# Patient Record
Sex: FEMALE | Race: WHITE | NOT HISPANIC OR LATINO | Employment: FULL TIME | URBAN - METROPOLITAN AREA
[De-identification: names, ages, dates, MRNs, and addresses within clinical notes are randomized per-mention and may not be internally consistent; named-entity substitution may affect disease eponyms.]

---

## 2018-10-19 ENCOUNTER — ANNUAL EXAM (OUTPATIENT)
Dept: GYNECOLOGY | Facility: CLINIC | Age: 63
End: 2018-10-19
Payer: COMMERCIAL

## 2018-10-19 VITALS
DIASTOLIC BLOOD PRESSURE: 80 MMHG | SYSTOLIC BLOOD PRESSURE: 142 MMHG | HEART RATE: 72 BPM | WEIGHT: 179 LBS | HEIGHT: 66 IN | RESPIRATION RATE: 15 BRPM | BODY MASS INDEX: 28.77 KG/M2

## 2018-10-19 DIAGNOSIS — Z11.51 SCREENING FOR HUMAN PAPILLOMAVIRUS: ICD-10-CM

## 2018-10-19 DIAGNOSIS — Z12.31 ENCOUNTER FOR SCREENING MAMMOGRAM FOR MALIGNANT NEOPLASM OF BREAST: ICD-10-CM

## 2018-10-19 DIAGNOSIS — Z79.890 HORMONE REPLACEMENT THERAPY: ICD-10-CM

## 2018-10-19 DIAGNOSIS — Z01.419 ENCOUNTER FOR GYNECOLOGICAL EXAMINATION (GENERAL) (ROUTINE) WITHOUT ABNORMAL FINDINGS: Primary | ICD-10-CM

## 2018-10-19 DIAGNOSIS — Z13.820 SCREENING FOR OSTEOPOROSIS: ICD-10-CM

## 2018-10-19 PROCEDURE — 99396 PREV VISIT EST AGE 40-64: CPT | Performed by: OBSTETRICS & GYNECOLOGY

## 2018-10-19 PROCEDURE — G0145 SCR C/V CYTO,THINLAYER,RESCR: HCPCS | Performed by: OBSTETRICS & GYNECOLOGY

## 2018-10-19 PROCEDURE — 87624 HPV HI-RISK TYP POOLED RSLT: CPT | Performed by: OBSTETRICS & GYNECOLOGY

## 2018-10-19 RX ORDER — ESTRADIOL 1 MG/1
1 TABLET ORAL DAILY
Qty: 90 TABLET | Refills: 4 | Status: SHIPPED | OUTPATIENT
Start: 2018-10-19 | End: 2019-09-27 | Stop reason: HOSPADM

## 2018-10-19 NOTE — PROGRESS NOTES
Assessment/Plan:    1  Annual GYN exam    2  SHEFALI and large cystocele -- referral to urogynecology  3   Pt wants to continue estrogen replacement therapy  Diagnoses and all orders for this visit:    Encounter for gynecological examination (general) (routine) without abnormal findings  -     Liquid-based pap, screening    Screening for human papillomavirus  -     Liquid-based pap, screening    Encounter for screening mammogram for malignant neoplasm of breast  -     Mammo screening bilateral w 3d & cad; Future    Screening for osteoporosis  -     DXA bone density spine hip and pelvis; Future    Hormone replacement therapy  -     estradiol (ESTRACE) 1 mg tablet; Take 1 tablet (1 mg total) by mouth daily          Subjective:      Patient ID: Bony Good is a 65 y o  female  The patient is a 69-year-old who presents for an annual gyn exam   She has had a previous hysterectomy and BSO for prolapse  She believes she had an anterior colporrhaphy done at the same time  She denies any vaginal bleeding or breast problems  The patient continues to have a problem with stress urinary incontinence and now has to wear a pad all the time  This is more and more annoying and she thinks she is ready to see a urogynecologist for evaluation and treatment for this  She was referred to Dr Drake Cameron  She denies any vaginal dryness or dyspareunia  The following portions of the patient's history were reviewed and updated as appropriate: allergies, current medications, past family history, past medical history, past social history, past surgical history and problem list     Review of Systems   Constitutional: Negative  Respiratory: Negative for shortness of breath  Cardiovascular: Negative for chest pain  Gastrointestinal: Negative  Genitourinary: Negative  Musculoskeletal: Positive for back pain  Skin: Negative  Psychiatric/Behavioral: Negative for agitation, behavioral problems and confusion  Objective:      /80 (Cuff Size: Adult)   Pulse 72   Resp 15   Ht 5' 6" (1 676 m)   Wt 81 2 kg (179 lb)   BMI 28 89 kg/m²          Physical Exam   Constitutional: She appears well-developed and well-nourished  No distress  HENT:   Head: Normocephalic  Pulmonary/Chest: Effort normal  No respiratory distress  Abdominal: She exhibits no distension and no mass  There is no tenderness  There is no rebound and no guarding  Genitourinary: Rectum normal and uterus normal  No breast swelling, tenderness, discharge or bleeding  No labial fusion  There is no rash, tenderness, lesion or injury on the right labia  There is no rash, tenderness, lesion or injury on the left labia  Right adnexum displays no mass  Left adnexum displays no mass  No erythema, tenderness or bleeding in the vagina  No foreign body in the vagina  No signs of injury around the vagina  No vaginal discharge found  Genitourinary Comments: The cervix uterus and adnexa are surgically absent  Atrophic vaginal changes are noted  Large cystocele  Lymphadenopathy:        Right axillary: No pectoral and no lateral adenopathy present  Left axillary: No pectoral and no lateral adenopathy present  Skin: Skin is warm, dry and intact  She is not diaphoretic  No cyanosis  Nails show no clubbing  Psychiatric: She has a normal mood and affect   Her speech is normal and behavior is normal

## 2018-10-23 LAB
HPV HR 12 DNA CVX QL NAA+PROBE: NEGATIVE
HPV16 DNA CVX QL NAA+PROBE: NEGATIVE
HPV18 DNA CVX QL NAA+PROBE: NEGATIVE

## 2018-10-25 LAB
LAB AP GYN PRIMARY INTERPRETATION: NORMAL
Lab: NORMAL

## 2018-11-29 ENCOUNTER — TELEPHONE (OUTPATIENT)
Dept: GYNECOLOGY | Facility: CLINIC | Age: 63
End: 2018-11-29

## 2018-11-29 DIAGNOSIS — R92.2 INCONCLUSIVE MAMMOGRAM: Primary | ICD-10-CM

## 2018-11-29 NOTE — TELEPHONE ENCOUNTER
Scanned breast ultrasound from [facility]  Attempted to send to Dr Drake Castillo  If does not show up in your inbasket, please comment below  Ultrasound report scanned in media tab

## 2018-11-30 NOTE — TELEPHONE ENCOUNTER
Notify pt that her u/s did not show any abnormalities, just some denser tissue  She needs a f/u bilat mammography in 6 months

## 2019-04-29 ENCOUNTER — TELEPHONE (OUTPATIENT)
Dept: FAMILY MEDICINE CLINIC | Facility: CLINIC | Age: 64
End: 2019-04-29

## 2019-05-13 ENCOUNTER — TELEPHONE (OUTPATIENT)
Dept: GYNECOLOGY | Facility: CLINIC | Age: 64
End: 2019-05-13

## 2019-05-13 ENCOUNTER — TELEPHONE (OUTPATIENT)
Dept: FAMILY MEDICINE CLINIC | Facility: CLINIC | Age: 64
End: 2019-05-13

## 2019-05-13 DIAGNOSIS — Z01.812 BLOOD TESTS PRIOR TO TREATMENT OR PROCEDURE: ICD-10-CM

## 2019-05-13 DIAGNOSIS — R92.8 ABNORMAL FINDING ON RADIOLOGICAL EXAMINATION OF BREAST: Primary | ICD-10-CM

## 2019-05-16 DIAGNOSIS — R92.2 INCONCLUSIVE MAMMOGRAM: ICD-10-CM

## 2019-05-17 ENCOUNTER — TELEPHONE (OUTPATIENT)
Dept: GYNECOLOGY | Facility: CLINIC | Age: 64
End: 2019-05-17

## 2019-05-20 ENCOUNTER — TELEPHONE (OUTPATIENT)
Dept: GYNECOLOGY | Facility: CLINIC | Age: 64
End: 2019-05-20

## 2019-05-21 ENCOUNTER — TELEPHONE (OUTPATIENT)
Dept: GYNECOLOGY | Facility: CLINIC | Age: 64
End: 2019-05-21

## 2019-06-03 ENCOUNTER — TELEPHONE (OUTPATIENT)
Dept: FAMILY MEDICINE CLINIC | Facility: CLINIC | Age: 64
End: 2019-06-03

## 2019-06-03 ENCOUNTER — TELEPHONE (OUTPATIENT)
Dept: GYNECOLOGY | Facility: CLINIC | Age: 64
End: 2019-06-03

## 2019-06-05 ENCOUNTER — TELEPHONE (OUTPATIENT)
Dept: GYNECOLOGY | Facility: CLINIC | Age: 64
End: 2019-06-05

## 2019-06-17 ENCOUNTER — TELEPHONE (OUTPATIENT)
Dept: GYNECOLOGY | Facility: CLINIC | Age: 64
End: 2019-06-17

## 2019-06-17 DIAGNOSIS — R92.2 INCONCLUSIVE MAMMOGRAM: Primary | ICD-10-CM

## 2019-06-18 ENCOUNTER — TELEPHONE (OUTPATIENT)
Dept: GYNECOLOGY | Facility: CLINIC | Age: 64
End: 2019-06-18

## 2019-06-18 ENCOUNTER — TELEPHONE (OUTPATIENT)
Dept: HEMATOLOGY ONCOLOGY | Facility: CLINIC | Age: 64
End: 2019-06-18

## 2019-06-18 DIAGNOSIS — R92.2 INCONCLUSIVE MAMMOGRAPHY: Primary | ICD-10-CM

## 2019-06-25 ENCOUNTER — HOSPITAL ENCOUNTER (OUTPATIENT)
Dept: MAMMOGRAPHY | Facility: CLINIC | Age: 64
Discharge: HOME | End: 2019-06-25
Payer: COMMERCIAL

## 2019-06-25 ENCOUNTER — TELEPHONE (OUTPATIENT)
Dept: GYNECOLOGY | Facility: CLINIC | Age: 64
End: 2019-06-25

## 2019-06-25 DIAGNOSIS — R92.2 INCONCLUSIVE MAMMOGRAM: ICD-10-CM

## 2019-06-25 PROCEDURE — 76642 ULTRASOUND BREAST LIMITED: CPT

## 2019-06-26 RX ORDER — DIAPER,BRIEF,INFANT-TODD,DISP
EACH MISCELLANEOUS 2 TIMES DAILY PRN
COMMUNITY

## 2019-06-26 NOTE — PRE-PROCEDURE INSTRUCTIONS
Pre-Surgery Instructions:   Medication Instructions    estradiol (ESTRACE) 1 mg tablet Instructed patient per Anesthesia Guidelines   fexofenadine (ALLEGRA) 180 MG tablet Instructed patient per Anesthesia Guidelines   hydrocortisone 0 5 % cream Instructed patient per Anesthesia Guidelines   lisinopril (ZESTRIL) 10 mg tablet Instructed patient per Anesthesia Guidelines   metoprolol tartrate (LOPRESSOR) 50 mg tablet Instructed patient per Anesthesia Guidelines  Patient via TC and per anesth guidelines  instructed to take* metoprolol with a sip of water the morning of surgery  Patient given/ instructed on use of chlorhexidine soap per hospital protocol    Patient instructed to stop all ASA, NSAIDS, vitamins and herbal supplements one week prior to surgery or per Dr Kyle Leslie

## 2019-06-27 ENCOUNTER — TELEPHONE (OUTPATIENT)
Dept: GYNECOLOGY | Facility: CLINIC | Age: 64
End: 2019-06-27

## 2019-06-28 ENCOUNTER — TELEPHONE (OUTPATIENT)
Dept: GYNECOLOGY | Facility: CLINIC | Age: 64
End: 2019-06-28

## 2019-06-28 ENCOUNTER — ANESTHESIA EVENT (OUTPATIENT)
Dept: PERIOP | Facility: HOSPITAL | Age: 64
End: 2019-06-28
Payer: COMMERCIAL

## 2019-07-01 ENCOUNTER — HOSPITAL ENCOUNTER (OUTPATIENT)
Facility: HOSPITAL | Age: 64
Setting detail: OUTPATIENT SURGERY
Discharge: HOME | End: 2019-07-01
Attending: OBSTETRICS & GYNECOLOGY | Admitting: OBSTETRICS & GYNECOLOGY
Payer: COMMERCIAL

## 2019-07-01 ENCOUNTER — ANESTHESIA (OUTPATIENT)
Dept: PERIOP | Facility: HOSPITAL | Age: 64
End: 2019-07-01
Payer: COMMERCIAL

## 2019-07-01 VITALS
BODY MASS INDEX: 26.68 KG/M2 | HEART RATE: 70 BPM | OXYGEN SATURATION: 98 % | HEIGHT: 66 IN | RESPIRATION RATE: 18 BRPM | WEIGHT: 166 LBS | DIASTOLIC BLOOD PRESSURE: 70 MMHG | TEMPERATURE: 98.1 F | SYSTOLIC BLOOD PRESSURE: 160 MMHG

## 2019-07-01 PROCEDURE — C1771 REP DEV, URINARY, W/SLING: HCPCS | Performed by: OBSTETRICS & GYNECOLOGY

## 2019-07-01 PROCEDURE — 51798 US URINE CAPACITY MEASURE: CPT | Performed by: OBSTETRICS & GYNECOLOGY

## 2019-07-01 DEVICE — SINGLE INCISION SLING SYSTEM
Type: IMPLANTABLE DEVICE | Site: VAGINA | Status: FUNCTIONAL
Brand: ALTIS

## 2019-07-01 RX ORDER — MIDAZOLAM HYDROCHLORIDE 1 MG/ML
INJECTION INTRAMUSCULAR; INTRAVENOUS AS NEEDED
Status: DISCONTINUED | OUTPATIENT
Start: 2019-07-01 | End: 2019-07-01 | Stop reason: SURG

## 2019-07-01 RX ORDER — FENTANYL CITRATE/PF 50 MCG/ML
25 SYRINGE (ML) INJECTION
Status: DISCONTINUED | OUTPATIENT
Start: 2019-07-01 | End: 2019-07-01 | Stop reason: HOSPADM

## 2019-07-01 RX ORDER — MAGNESIUM HYDROXIDE 1200 MG/15ML
LIQUID ORAL AS NEEDED
Status: DISCONTINUED | OUTPATIENT
Start: 2019-07-01 | End: 2019-07-01 | Stop reason: HOSPADM

## 2019-07-01 RX ORDER — CEFAZOLIN SODIUM 2 G/50ML
2000 SOLUTION INTRAVENOUS ONCE
Status: COMPLETED | OUTPATIENT
Start: 2019-07-01 | End: 2019-07-01

## 2019-07-01 RX ORDER — ACETAMINOPHEN 325 MG/1
650 TABLET ORAL EVERY 6 HOURS PRN
Status: DISCONTINUED | OUTPATIENT
Start: 2019-07-01 | End: 2019-07-01 | Stop reason: HOSPADM

## 2019-07-01 RX ORDER — DEXAMETHASONE SODIUM PHOSPHATE 4 MG/ML
INJECTION, SOLUTION INTRA-ARTICULAR; INTRALESIONAL; INTRAMUSCULAR; INTRAVENOUS; SOFT TISSUE AS NEEDED
Status: DISCONTINUED | OUTPATIENT
Start: 2019-07-01 | End: 2019-07-01 | Stop reason: SURG

## 2019-07-01 RX ORDER — KETOROLAC TROMETHAMINE 30 MG/ML
INJECTION, SOLUTION INTRAMUSCULAR; INTRAVENOUS AS NEEDED
Status: DISCONTINUED | OUTPATIENT
Start: 2019-07-01 | End: 2019-07-01 | Stop reason: SURG

## 2019-07-01 RX ORDER — ONDANSETRON 2 MG/ML
INJECTION INTRAMUSCULAR; INTRAVENOUS AS NEEDED
Status: DISCONTINUED | OUTPATIENT
Start: 2019-07-01 | End: 2019-07-01 | Stop reason: SURG

## 2019-07-01 RX ORDER — SODIUM CHLORIDE 9 MG/ML
125 INJECTION, SOLUTION INTRAVENOUS CONTINUOUS
Status: DISCONTINUED | OUTPATIENT
Start: 2019-07-01 | End: 2019-07-01 | Stop reason: HOSPADM

## 2019-07-01 RX ORDER — OXYCODONE HYDROCHLORIDE 5 MG/1
5 TABLET ORAL EVERY 4 HOURS PRN
Status: DISCONTINUED | OUTPATIENT
Start: 2019-07-01 | End: 2019-07-01 | Stop reason: HOSPADM

## 2019-07-01 RX ORDER — ONDANSETRON 2 MG/ML
4 INJECTION INTRAMUSCULAR; INTRAVENOUS ONCE AS NEEDED
Status: DISCONTINUED | OUTPATIENT
Start: 2019-07-01 | End: 2019-07-01 | Stop reason: HOSPADM

## 2019-07-01 RX ORDER — PROPOFOL 10 MG/ML
INJECTION, EMULSION INTRAVENOUS AS NEEDED
Status: DISCONTINUED | OUTPATIENT
Start: 2019-07-01 | End: 2019-07-01 | Stop reason: SURG

## 2019-07-01 RX ORDER — FENTANYL CITRATE 50 UG/ML
INJECTION, SOLUTION INTRAMUSCULAR; INTRAVENOUS AS NEEDED
Status: DISCONTINUED | OUTPATIENT
Start: 2019-07-01 | End: 2019-07-01 | Stop reason: SURG

## 2019-07-01 RX ORDER — IBUPROFEN 600 MG/1
600 TABLET ORAL EVERY 6 HOURS PRN
Status: DISCONTINUED | OUTPATIENT
Start: 2019-07-01 | End: 2019-07-01 | Stop reason: HOSPADM

## 2019-07-01 RX ADMIN — MIDAZOLAM 2 MG: 1 INJECTION INTRAMUSCULAR; INTRAVENOUS at 13:22

## 2019-07-01 RX ADMIN — DEXAMETHASONE SODIUM PHOSPHATE 4 MG: 4 INJECTION, SOLUTION INTRAMUSCULAR; INTRAVENOUS at 13:45

## 2019-07-01 RX ADMIN — CEFAZOLIN SODIUM 2000 MG: 2 SOLUTION INTRAVENOUS at 13:40

## 2019-07-01 RX ADMIN — PROPOFOL 50 MG: 10 INJECTION, EMULSION INTRAVENOUS at 13:34

## 2019-07-01 RX ADMIN — KETOROLAC TROMETHAMINE 30 MG: 30 INJECTION, SOLUTION INTRAMUSCULAR at 14:40

## 2019-07-01 RX ADMIN — LIDOCAINE HYDROCHLORIDE 50 MG: 20 INJECTION, SOLUTION INTRAVENOUS at 13:33

## 2019-07-01 RX ADMIN — FENTANYL CITRATE 25 MCG: 50 INJECTION, SOLUTION INTRAMUSCULAR; INTRAVENOUS at 13:34

## 2019-07-01 RX ADMIN — PROPOFOL 180 MG: 10 INJECTION, EMULSION INTRAVENOUS at 13:33

## 2019-07-01 RX ADMIN — PROPOFOL 50 MG: 10 INJECTION, EMULSION INTRAVENOUS at 13:35

## 2019-07-01 RX ADMIN — ONDANSETRON HYDROCHLORIDE 4 MG: 2 INJECTION, SOLUTION INTRAVENOUS at 14:40

## 2019-07-01 RX ADMIN — SODIUM CHLORIDE: 0.9 INJECTION, SOLUTION INTRAVENOUS at 14:15

## 2019-07-01 RX ADMIN — FENTANYL CITRATE 25 MCG: 50 INJECTION, SOLUTION INTRAMUSCULAR; INTRAVENOUS at 14:13

## 2019-07-01 RX ADMIN — FENTANYL CITRATE 25 MCG: 50 INJECTION, SOLUTION INTRAMUSCULAR; INTRAVENOUS at 13:35

## 2019-07-01 RX ADMIN — SODIUM CHLORIDE 125 ML/HR: 0.9 INJECTION, SOLUTION INTRAVENOUS at 10:57

## 2019-07-01 RX ADMIN — FENTANYL CITRATE 25 MCG: 50 INJECTION, SOLUTION INTRAMUSCULAR; INTRAVENOUS at 14:47

## 2019-07-01 NOTE — ANESTHESIA POSTPROCEDURE EVALUATION
Post-Op Assessment Note    CV Status:  Stable    Pain management: adequate     Mental Status:  Alert and awake   Hydration Status:  Euvolemic   PONV Controlled:  Controlled   Airway Patency:  Patent   Post Op Vitals Reviewed: Yes      Staff: Anesthesiologist           /78 (07/01/19 1555)    Temp      Pulse 74 (07/01/19 1555)   Resp 18 (07/01/19 1555)    SpO2 97 % (07/01/19 1555)

## 2019-07-01 NOTE — DISCHARGE INSTRUCTIONS
Posterior Vaginal Repair   WHAT YOU NEED TO KNOW:   A posterior vaginal repair is surgery to fix a rectocele or vaginal hernia  DISCHARGE INSTRUCTIONS:   Medicines:   · Pain medicine  will help take away or decrease pain  Do not wait until the pain is severe before you take your medicine  · NSAIDs , such as ibuprofen, help decrease swelling, pain, and fever  This medicine is available with or without a doctor's order  NSAIDs can cause stomach bleeding or kidney problems in certain people  If you take blood thinner medicine, always ask your healthcare provider if NSAIDs are safe for you  Always read the medicine label and follow directions  · Bowel movement softeners  make it easier for you to have a bowel movement  You may need this medicine to treat or prevent constipation  · Take your medicine as directed  Contact your healthcare provider if you think your medicine is not helping or if you have side effects  Tell him or her if you are allergic to any medicine  Keep a list of the medicines, vitamins, and herbs you take  Include the amounts, and when and why you take them  Bring the list or the pill bottles to follow-up visits  Carry your medicine list with you in case of an emergency  Follow up with your gynecologist in 2 weeks: You will need to return to have your incision checked  Write down your questions so you remember to ask them during your visits  Self-care:   · Do not have sex  until your healthcare provider says it is okay  · Do not put anything in your vagina  for 6 weeks after the surgery  This allows time for the wound to heal      · Do not lift more than 10 pounds  for at least 6 weeks  Heavy lifting puts pressure on the surgery area and slows healing  · Avoid heavy exercise  the first few weeks after the surgery  You may try light activity, such as short walks, 3 to 4 weeks after the surgery  · Try not to cough or strain to have a bowel movement    This may cause damage to the surgery area  Ask your healthcare provider about ways to make bowel movements easier so you do not have to strain  · Eat healthy foods and drink liquids as directed  This will help prevent constipation  Healthy foods include fruits, vegetables, whole-grain breads, low-fat dairy products, beans, lean meats, and fish  Contact your healthcare provider or gynecologist if:   · You have vaginal pain that does not go away, even after you take pain medicine  · You have pus or a foul-smelling discharge from your vagina  · You have pain during sex  · You have a fever or chills  · Your wound is red, swollen, or draining pus  · You have questions or concerns about your condition or care  Seek care immediately or call 911 if:   · You soak a sanitary pad with blood every hour for 4 hours  · You feel something is bulging out into your vagina or rectum and not going back in     · You cannot urinate  © 2017 [address]  Information is for End User's use only and may not be sold, redistributed or otherwise used for commercial purposes  All illustrations and images included in CareNotes® are the copyrighted property of [employer]  or Alfredo Velasquez  The above information is an  only  It is not intended as medical advice for individual conditions or treatments  Talk to your doctor, nurse or pharmacist before following any medical regimen to see if it is safe and effective for you  Bladder Sling Procedure   WHAT YOU NEED TO KNOW:   A bladder sling procedure is surgery to treat urinary incontinence in women  The sling acts as a hammock to keep your urethra in place and hold it closed when your bladder is full  You may have vaginal bleeding or discharge for up to a week after your surgery  Use sanitary pads  Do not use tampons  You may have some pelvic discomfort or trouble urinating     DISCHARGE INSTRUCTIONS:   Call 911 for any of the following:   · You have sudden trouble breathing  Seek care immediately if:   · Your bleeding gets worse  · You have yellow or foul smelling discharge from your vagina  · You cannot urinate, or you are urinating less than what is normal for you  · You feel confused  Contact your healthcare provider if:   · You have a fever  · You do not feel like you are able to empty your bladder completely when you urinate  · You feel the need to urinate very suddenly  · You have burning or stinging when you urinate  · You have blood in your urine  · Your skin is itchy, swollen, or you have a rash  · You have questions or concerns about your condition or care  Medicines:   · Prescription pain medicine  may be given  Ask your how to take this medicine safely  · Take your medicine as directed  Contact your healthcare provider if you think your medicine is not helping or if you have side effects  Tell him or her if you are allergic to any medicine  Keep a list of the medicines, vitamins, and herbs you take  Include the amounts, and when and why you take them  Bring the list or the pill bottles to follow-up visits  Carry your medicine list with you in case of an emergency  Self-catheterization:  You may need to put a catheter into your bladder after you urinate to empty any remaining urine  A catheter is a small rubber tube used to drain urine  Healthcare providers will teach you how to put the catheter in safely  This may be needed until you are completely emptying your bladder  Hickman catheter: You may have a Hickman catheter for a short period of time  The Hickman is a tube put into your bladder to drain urine into a bag  Keep the bag below your waist  This will prevent urine from flowing back into your bladder and causing an infection or other problems  Also, keep the tube free of kinks so the urine will drain properly  Do not pull on the catheter  This can cause pain and bleeding, and may cause the catheter to come out  Activity:  Do not lift heavy objects for 6 weeks after your procedure  Do not have intercourse for 4 to 6 weeks  Do not use a tampon for 4 weeks  Ask your healthcare provider when you can return to work or your usual activities  Do pelvic muscle exercises: These are also called Kegel exercises  These exercises help strengthen your pelvic muscles and help prevent urine leakage  Tighten the muscles of your pelvis and hold them tight for 5 seconds, then relax for 5 seconds  Gradually work up to tightening them for 10 seconds and relaxing for 10 seconds  Do this 3 times each day  Keep a record:  Keep a record of when you urinate and if you leak any urine  Write down what you were doing when you leaked urine, such as coughing or sneezing  Bring the log to your follow-up visits  Prevent constipation:  Drink liquids as directed  You may need to drink more water than usual to soften your bowel movements  Eat a variety of healthy foods, especially fruit and foods high in fiber  You may need to use an over-the-counter bowel movement softener  Follow up with your healthcare provider as directed: You may need a test to check how much urine remains in your bladder after you urinate  This will help show how the sling is working  Write down your questions so you remember to ask them during your visits  © 2017 [address] Information is for End User's use only and may not be sold, redistributed or otherwise used for commercial purposes  All illustrations and images included in CareNotes® are the copyrighted property of A "[employer] A M [employer] Inc  or Alfredo Velasquez  The above information is an  only  It is not intended as medical advice for individual conditions or treatments  Talk to your doctor, nurse or pharmacist before following any medical regimen to see if it is safe and effective for you

## 2019-07-01 NOTE — OP NOTE
OPERATIVE REPORT  PATIENT NAME: Osman Villalobos    :  1955  MRN: [id number]  Pt Location: [facility] OR ROOM 04    SURGERY DATE: 2019    Surgeon(s) and Role: * Aurelia Garcia MD - Primary     * Corinne Mix, MD - Fellow, Chelsy Major MD - Fellow, Present    Preop Diagnosis:  Rectocele [N81 6]  Stress incontinence [N39 3]  Hypermobility of urethra [N36 41]    Post-Op Diagnosis Codes:     * Rectocele [N81 6]     * Stress incontinence [N39 3]     * Hypermobility of urethra [N36 41]    Procedure(s) (LRB):  POSTERIOR COLPORRHAPHY (N/A)  SINGLE INCISION SLING (N/A)  CYSTOSCOPY (N/A)    Specimen(s):  * No specimens in log *    Estimated Blood Loss:   Minimal    Drains:  * No LDAs found *    Anesthesia Type:   Epidural    Operative Indications:  Rectocele [N81 6]  Stress incontinence [N39 3]  Hypermobility of urethra [N36 41]    Operative Findings:  1  Stage 2 rectocele  2  Cystoscopy: efflux noted from bilateral ureteral orifices  No mesh, suture material, or injury noted to bladder lumen  Complications:   None    Procedure and Technique:    Appropriate preoperative antibiotics chosen per ACOG guidelines were given  Bilateral SCDs were placed in the lower extremities for DVT prevention prior to the institution of anesthesia  No bladder, ureteral, viscus, or solid organ injury were noted at the end of the procedure  The patient was identified in the holding area by the operating room staff and attending physician  She was taken to the operating room where anesthesia was instituted without complications  She was placed in the dorsal lithotomy position with the legs in [address] [place] [address] with care taken to avoid excessive flexion or extension of her lower extremities  The patient was prepped and draped in the usual sterile fashion  A Hickman catheter was inserted  Next, we turned our attention to the single incision sling   The mid urethral zone was identified by reference to the Hickman catheter and urethral meatus  Local anesthetic solution was injected into the anterior vaginal wall muscularis at the mid urethral level for hydrodissection and vasoconstriction, 10 mL was injected at the midline  Next, an additional 10 mL was injected to the left and right of midline directing the infiltration laterally towards the cephalad aspect of the inferior pubic ramus bilaterally  Care was taken to ensure that the anterolateral sulcus was flattened by the infiltration to minimize chance for buttonholing or sling (tape) becoming too close to the anterolateral sulcus  After completion of hydrodissection, 2 Allis clamps were used to grasp the anterior vaginal wall for traction  A 1 5 cm incision was made into the midline through mucosa and muscularis  Two Allis clamps were then placed on each cut edge of the incision for stabilization  Tenotomy scissors were used to create a small vaginal tunnel with sharp and blunt dissection above the anterior vaginal wall directed laterally towards the cephalad aspect of the inferior pubic ramus bilaterally  Dissection was carried out to the edge of the bone itself but without dissection into the obturator internus muscle  Once the dissection was complete, the Altis sling system was placed  An index finger was placed in the vagina for guidance  The Altis trocar and fixed anchor was placed into the pre-dissected tract  The handle was held horizontally in a slight upward angle to avoid buttonholing of the sulcus  Cephalad drift was used to allow passage around the inferior pubic ramus  A thumb was placed on the heel of the introducer to allow a lateral followed by a rotation push maneuver to place a fixed anchor into the obturator internus muscle membrane complex on the patient's left side  Proper handle deviation confirmed proper anchor placement, as well as inspection of the sling itself   Once the introducer was removed, proper anchor placement was confirmed by gentle tugging on the sling  The exact same sequence of steps was repeated on the patient's right side with the adjustable anchor and trocar  Once placement of dynamic anchor was completed, the introducer was removed and gentle tugging again confirmed proper anchor placement  The Hickman catheter was then removed and a diagnostic cystoscopy was performed by instilling 300 mL of fluid into the bladder  Both ureters were effluxing normally and there were no lacerations or evidence of injury to the lower urinary tract  The tensioning suture was used to adjust the tape until there was minimal to no leakage with Crede  After appropriate tensioning, the tensioning suture was cut  The vaginal incision was closed with 2-0 Vicryl suture in a running locked stitch  Attention was then turned to the posterior compartment where two Allis clamps were placed in the posterior fourchette over the mucocutaneous border to reduce the markedly relaxed vaginal outlet  Dilute 0 25% Marcaine solution with epinephrine was injected into the perineum  A hugo-shaped incision was made extending from the vaginal mucosa onto the perineum  The overlying scarred vaginal epithelium and perineal skin was removed en bloc with the Bovie device with excellent hemostasis noted throughout  With a concomitant digital rectal examination to deviate the rectum away from the dissection planes, the rectovaginal space was entered sharply and the incision was extended to the level of the cuff  The vaginal full-thickness incision was extended cephalad with Bovie vaporization  The rectal muscularis layer was plicated with a 2-0 Vicryl in a side-side fashion  The posterior colporrhaphy was closed with a 2-0 Vicryl suture in a running locked stitch  We reapproximated the perineal body with a 0-Vicryl interrupted suture  The remainder of the colporrhaphy was closed to the level of the hymen   The perineal skin was closed with with 2-0 Vicryl in a subcuticular fashion  The Hickman catheter was removed and bladder drained  The sponge, needle and instrument count were correct x 2  The patient tolerated the procedure well  She was awakened from anesthesia and transferred to the recovery room in stable condition  A qualified resident physician was not available to assist  Dr Hood Mathews was present for the entire procedure      Patient Disposition:  PACU     SIGNATURE: Marito Hoskins MD  DATE: July 1, 2019  TIME: 2:43 PM

## 2019-07-01 NOTE — ANESTHESIA PREPROCEDURE EVALUATION
Review of Systems/Medical History  Patient summary reviewed  Chart reviewed  No history of anesthetic complications     Cardiovascular  Hypertension , Dysrhythmias (tachycardia--? food allergy mediated?) ,    Pulmonary  Negative pulmonary ROS        GI/Hepatic  Negative GI/hepatic ROS     Comment: Gluten sensitivity  IBS     Negative  ROS        Endo/Other  Negative endo/other ROS      GYN  Negative gynecology ROS          Hematology  Negative hematology ROS      Musculoskeletal    Arthritis     Neurology  Negative neurology ROS      Psychology   Anxiety,              Physical Exam    Airway    Mallampati score: II  TM Distance: >3 FB  Neck ROM: full     Dental   No notable dental hx     Cardiovascular  Rhythm: regular, Rate: normal, Cardiovascular exam normal    Pulmonary  Pulmonary exam normal Breath sounds clear to auscultation,     Other Findings        Anesthesia Plan  ASA Score- 2     Anesthesia Type- general with ASA Monitors  Additional Monitors:   Airway Plan:         Plan Factors-Patient not instructed to abstain from smoking on day of procedure  Patient did not smoke on day of surgery  Induction- intravenous  Postoperative Plan-     Informed Consent- Anesthetic plan and risks discussed with patient and spouse

## 2019-07-02 ENCOUNTER — TELEPHONE (OUTPATIENT)
Dept: GYNECOLOGY | Facility: CLINIC | Age: 64
End: 2019-07-02

## 2019-07-02 NOTE — TELEPHONE ENCOUNTER
Pt called on 7/1/19 because she is not sure what the next step is  Spoke to Dr Mitra Bob and pt was notified that she needs to see Dr Creig Halsted about her breast MRI and her US she had done recently  Pt states she will call

## 2019-08-06 ENCOUNTER — CONSULT (OUTPATIENT)
Dept: SURGICAL ONCOLOGY | Facility: CLINIC | Age: 64
End: 2019-08-06

## 2019-08-06 VITALS
WEIGHT: 174.2 LBS | SYSTOLIC BLOOD PRESSURE: 140 MMHG | HEIGHT: 66 IN | RESPIRATION RATE: 18 BRPM | TEMPERATURE: 99.2 F | HEART RATE: 96 BPM | DIASTOLIC BLOOD PRESSURE: 90 MMHG | BODY MASS INDEX: 28 KG/M2

## 2019-08-06 DIAGNOSIS — Z91.89 INCREASED RISK OF BREAST CANCER: ICD-10-CM

## 2019-08-06 DIAGNOSIS — R92.2 DENSE BREAST TISSUE: ICD-10-CM

## 2019-08-06 DIAGNOSIS — R92.8 ABNORMAL MRI, BREAST: Primary | ICD-10-CM

## 2019-08-06 DIAGNOSIS — R92.8 ABNORMAL MAMMOGRAM OF BOTH BREASTS: ICD-10-CM

## 2019-08-06 PROBLEM — R92.30 DENSE BREAST TISSUE: Status: ACTIVE | Noted: 2019-08-06

## 2019-08-06 PROCEDURE — 99204 OFFICE O/P NEW MOD 45 MIN: CPT | Performed by: SURGERY

## 2019-08-06 RX ORDER — MAG HYDROX/ALUMINUM HYD/SIMETH 400-400-40
SUSPENSION, ORAL (FINAL DOSE FORM) ORAL AS NEEDED
COMMUNITY

## 2019-08-06 RX ORDER — NICOTINE POLACRILEX 2 MG
1 LOZENGE BUCCAL DAILY
COMMUNITY
End: 2019-09-16 | Stop reason: ALTCHOICE

## 2019-08-06 NOTE — PROGRESS NOTES
Breast Consultation-Surgical Oncology     [facility]  [facility]GICAL [facility]    Name:  Deya Howard  YOB: 1955  MRN:  [id number]    Assessment/Plan   Diagnoses and all orders for this visit:    Abnormal MRI, breast  -     MRI breast bilateral w and wo contrast w cad; Future    Abnormal mammogram of both breasts    Dense breast tissue    Increased risk of breast cancer    Other orders  -     multivitamin-iron-minerals-folic acid (THERAPEUTIC-M) TABS tablet; Take 1 tablet by mouth daily  -     Cholecalciferol (VITAMIN D3) 5000 units CAPS; Take by mouth  -     Cranberry (THERACRAN HP PO); Take 36 mg by mouth daily            HPI: Deya Hwoard is a 59y o  year old female who presents with concerns regarding her breast imaging  She denies any breast for referable complaints herself  She denies any family history of breast cancer  She has had one prior benign biopsy of the left breast   She is not sure exactly what this showed other than it was not cancer risk  She does have dense breast tissue  Surgical treatment to date consisted of not applicable  Oncology History:     No history exists         Pertinent reproductive history:    OB History        2    Para   2    Term                AB        Living           SAB        TAB        Ectopic        Multiple        Live Births   2           Obstetric Comments   Menarche : 16 or 16  Age at first childbirth : 33  Hx of BCP x 4 months   Estrogen replacement since                Age at menopause:  47  Hysterectomy/Oophrectomy:  Yes  Hormone replacement therapy:  Yes      Problem List:   Patient Active Problem List   Diagnosis    Abnormal MRI, breast    Abnormal mammogram of both breasts    Dense breast tissue    Increased risk of breast cancer     Past Medical History:   Diagnosis Date    Anxiety     "time to time"    Arthritis     Dental crowns present  Eczema     Environmental and seasonal allergies     Food allergy     Hypertension     Irritable bowel syndrome     Lactose intolerance     Motion sickness     Osteoarthritis     mainly hands/feet/right hip    Osteopenia     Urinary leakage     Vaginal Pap smear 10/06/2017    WNL    Wears contact lenses     Wears glasses      Past Surgical History:   Procedure Laterality Date    BREAST BIOPSY Left     marker implanted    COLONOSCOPY      CYSTOSCOPY N/A 7/1/2019    Procedure: CYSTOSCOPY;  Surgeon: Aurelia Garcia MD;  [facility];  Service: UroGynecology           ENDOMETRIAL ABLATION      HAND SURGERY Left     cyst removed from palm    LIVER BIOPSY      x2 early 19's    OK POST COLPORRHAPHY,RECTUM/VAGINA N/A 7/1/2019    Procedure: POSTERIOR COLPORRHAPHY;  Surgeon: Aurelia Garcia MD;  [facility];  Service: UroGynecology           PUBOVAGINAL SLING N/A 7/1/2019    Procedure: SINGLE INCISION SLING;  Surgeon: Aurelia Garcia MD;  [facility];  Service: UroGynecology           VAGINAL HYSTERECTOMY  01/01/2010    BSO AND REPAIR   UTEROVAGINAL PROLAPSE    WISDOM TOOTH EXTRACTION       Family History   Problem Relation Age of Onset    Lung cancer Family     Diabetes Family     Lung cancer Maternal Aunt     Lung cancer Father      Social History     Socioeconomic History    Marital status: /Civil Union     Spouse name: Not on file    Number of children: Not on file    Years of education: Not on file    Highest education level: Not on file   Occupational History    Not on file   Social Needs    Financial resource strain: Not on file    Food insecurity:     Worry: Not on file     Inability: Not on file    Transportation needs:     Medical: Not on file     Non-medical: Not on file   Tobacco Use    Smoking status: Never Smoker    Smokeless tobacco: Never Used   Substance and Sexual Activity    Alcohol use:  Yes     Alcohol/week: 1 0 standard drinks Types: 1 Standard drinks or equivalent per week     Frequency: Monthly or less     Drinks per session: 1 or 2    Drug use: No    Sexual activity: Not on file   Lifestyle    Physical activity:     Days per week: Not on file     Minutes per session: Not on file    Stress: Not on file   Relationships    Social connections:     Talks on phone: Not on file     Gets together: Not on file     Attends Latter-day service: Not on file     Active member of club or organization: Not on file     Attends meetings of clubs or organizations: Not on file     Relationship status: Not on file    Intimate partner violence:     Fear of current or ex partner: Not on file     Emotionally abused: Not on file     Physically abused: Not on file     Forced sexual activity: Not on file   Other Topics Concern    Not on file   Social History Narrative    Not on file     Current Outpatient Medications   Medication Sig Dispense Refill    Cholecalciferol (VITAMIN D3) 5000 units CAPS Take by mouth      Cranberry (THERACRAN HP PO) Take 36 mg by mouth daily      estradiol (ESTRACE) 1 mg tablet Take 1 tablet (1 mg total) by mouth daily 90 tablet 4    fexofenadine (ALLEGRA) 180 MG tablet daily as needed       hydrocortisone 0 5 % cream Apply topically 2 (two) times a day as needed      lisinopril (ZESTRIL) 10 mg tablet 10 mg daily       metoprolol tartrate (LOPRESSOR) 50 mg tablet daily       multivitamin-iron-minerals-folic acid (THERAPEUTIC-M) TABS tablet Take 1 tablet by mouth daily       No current facility-administered medications for this visit        Allergies   Allergen Reactions    Meperidine Nausea Only, Anxiety and Palpitations    Pineapple Tongue Swelling    Shellfish-Derived Products Throat Swelling     Happens sometimes with combination of seafood    Strawberry C [Ascorbate]      Strawberries sometimes tongue swells    Lactose GI Intolerance     intolerance    Pollen Extract Itching and Nasal Congestion     Receives allergy shots also allergy to dust mites/insects    Tree Extract Allergic Rhinitis         The following portions of the patient's history were reviewed and updated as appropriate: allergies, current medications, past family history, past medical history, past social history, past surgical history and problem list     Review of Systems:  Review of Systems   Constitutional: Negative  Negative for appetite change and fever  Eyes: Negative  Respiratory: Negative for shortness of breath  Cardiovascular: Negative  Gastrointestinal: Negative  Endocrine: Negative  Genitourinary: Positive for frequency  Musculoskeletal: Positive for arthralgias  Negative for myalgias  Skin: Negative  Allergic/Immunologic: Negative  Neurological: Positive for headaches  Hematological: Negative  Negative for adenopathy  Does not bruise/bleed easily  Psychiatric/Behavioral: Negative  Physical Exam:  Physical Exam   Constitutional: She is oriented to person, place, and time  She appears well-developed and well-nourished  HENT:   Head: Normocephalic and atraumatic  Cardiovascular: Normal heart sounds  Pulmonary/Chest: Breath sounds normal  Right breast exhibits no inverted nipple, no mass, no nipple discharge, no skin change and no tenderness  Left breast exhibits no inverted nipple, no mass, no nipple discharge, no skin change and no tenderness  Abdominal: Soft  Lymphadenopathy:        Right axillary: No pectoral and no lateral adenopathy present  Left axillary: No pectoral and no lateral adenopathy present  Right: No supraclavicular adenopathy present  Left: No supraclavicular adenopathy present  Neurological: She is alert and oriented to person, place, and time  Psychiatric: She has a normal mood and affect         Laboratory:  Patient has a brevagen report from 2014 showing a lifetime risk of 20%     paul frank done today in the office shows a lifetime risk of 22 9%    Imaging: For 05/13/2019 bilateral 3D mammogram shows an area of distortion in the upper central left breast as well as central right breast; the right breast was reported as stable compared to 2018  This was a BI-RADS 0 study with an MRI recommended  06/10/2019 bilateral breast MRI there was no MRI correlate for the area on the left there was one area of enhancement for which a follow-up was recommended  There were two enhancing nodule seen on the right side upper central and right outer central for which a second-look ultrasound was recommended  06/25/2019 second-look ultrasound of the right breast shows no sonographic correlates and a six month follow-up MRI was recommended  Discussion/Summary:  29-year-old female here today secondary to a concern on her recent breast imaging  She had an abnormal mammogram which led to a breast MRI as well as second-look ultrasound of the right breast only  Ultimately a follow-up MRI was recommended in six months, which will be due in December  There are no concerns on her examination today  She does not have any family history of breast cancer  She does have dense breast tissue  She is a high risk patient likely secondary to the dense tissue, prior biopsy and use of hormone replacement as well as being overweight postmenopausal   I did review these findings with her  I will make arrangements for her breast MRI for December  I will see her again in six months or sooner should the need arise

## 2019-08-16 ENCOUNTER — HOSPITAL ENCOUNTER (OUTPATIENT)
Dept: MAMMOGRAPHY | Facility: CLINIC | Age: 64
Discharge: HOME | End: 2019-08-16

## 2019-08-16 DIAGNOSIS — Z76.89 REFERRAL OF PATIENT WITHOUT EXAMINATION OR TREATMENT: ICD-10-CM

## 2019-08-19 DIAGNOSIS — R92.8 ABNORMAL MAMMOGRAM OF BOTH BREASTS: Primary | ICD-10-CM

## 2019-08-20 ENCOUNTER — TELEPHONE (OUTPATIENT)
Dept: SURGICAL ONCOLOGY | Facility: CLINIC | Age: 64
End: 2019-08-20

## 2019-08-20 NOTE — TELEPHONE ENCOUNTER
Pt has been informed of recommendations for bilateral breast ultrasounds and possible left breast biopsy  She understands   Appt scheduled at the [facility] for 08/22/19 at 9:45 AM

## 2019-08-20 NOTE — TELEPHONE ENCOUNTER
----- Message from Natty Bryson MD sent at 8/19/2019  1:38 PM EDT -----  She needs another ultrasound, bilateral with possible biopsy on the left side; see outside image review we requested

## 2019-08-22 ENCOUNTER — HOSPITAL ENCOUNTER (OUTPATIENT)
Dept: ULTRASOUND IMAGING | Facility: CLINIC | Age: 64
Discharge: HOME | End: 2019-08-22

## 2019-08-22 ENCOUNTER — HOSPITAL ENCOUNTER (OUTPATIENT)
Dept: ULTRASOUND IMAGING | Facility: CLINIC | Age: 64
Discharge: HOME | End: 2019-08-22
Payer: COMMERCIAL

## 2019-08-22 VITALS — HEIGHT: 66 IN | WEIGHT: 166 LBS | BODY MASS INDEX: 26.68 KG/M2

## 2019-08-22 DIAGNOSIS — R92.8 ABNORMAL MRI, BREAST: Primary | ICD-10-CM

## 2019-08-22 DIAGNOSIS — R92.8 ABNORMAL MAMMOGRAM OF BOTH BREASTS: ICD-10-CM

## 2019-08-22 DIAGNOSIS — R92.2 INCONCLUSIVE MAMMOGRAM: Primary | ICD-10-CM

## 2019-08-22 PROCEDURE — 76642 ULTRASOUND BREAST LIMITED: CPT

## 2019-08-22 RX ORDER — LIDOCAINE HYDROCHLORIDE 10 MG/ML
4 INJECTION, SOLUTION INFILTRATION; PERINEURAL ONCE
Status: DISCONTINUED | OUTPATIENT
Start: 2019-08-22 | End: 2019-08-23 | Stop reason: HOSPADM

## 2019-08-23 ENCOUNTER — TELEPHONE (OUTPATIENT)
Dept: SURGICAL ONCOLOGY | Facility: CLINIC | Age: 64
End: 2019-08-23

## 2019-08-23 NOTE — TELEPHONE ENCOUNTER
As per Dr Mabel Garcia, pt has been informed of the recommendations for bilateral MRI guided breast biopsies  Pt understands  Appts have been scheduled for 08/30/19  Instructed pt to refrain from blood thinning medications

## 2019-08-23 NOTE — TELEPHONE ENCOUNTER
----- Message from Carter Kumar MD sent at 8/22/2019  4:25 PM EDT -----  Patient needs bilateral MRI guided biopsies, orders are in, according to node at [facility] patient is aware

## 2019-08-26 NOTE — PROGRESS NOTES
Spoke with patient after Dr Ezekiel Bah made    recommendation for;      __x___ RIGHT ___x___LEFT      _____Ultrasound guided__x___ MRI Guided  ______Stereotactic  Breast biopsy  __x___Verbalized understanding        Blood thinners:  _____yes ___x__no    Date stopped: ____n/a_______    Biopsy teaching sheet given:  _______yes ___x___no  Verbal review of procedure & preparation for procedure reviewed with pt

## 2019-08-30 ENCOUNTER — HOSPITAL ENCOUNTER (OUTPATIENT)
Dept: RADIOLOGY | Facility: HOSPITAL | Age: 64
Discharge: HOME | End: 2019-08-30
Attending: SURGERY
Payer: COMMERCIAL

## 2019-08-30 DIAGNOSIS — R92.8 ABNORMAL MRI, BREAST: ICD-10-CM

## 2019-08-30 PROCEDURE — 88360 TUMOR IMMUNOHISTOCHEM/MANUAL: CPT | Performed by: PATHOLOGY

## 2019-08-30 PROCEDURE — 88305 TISSUE EXAM BY PATHOLOGIST: CPT | Performed by: PATHOLOGY

## 2019-08-30 PROCEDURE — 88363 XM ARCHIVE TISSUE MOLEC ANAL: CPT | Performed by: PATHOLOGY

## 2019-08-30 PROCEDURE — 88342 IMHCHEM/IMCYTCHM 1ST ANTB: CPT | Performed by: PATHOLOGY

## 2019-08-30 PROCEDURE — 19085 BX BREAST 1ST LESION MR IMAG: CPT

## 2019-08-30 PROCEDURE — A9585 GADOBUTROL INJECTION: HCPCS | Performed by: SURGERY

## 2019-08-30 PROCEDURE — 19086 BX BREAST ADD LESION MR IMAG: CPT

## 2019-08-30 PROCEDURE — 88341 IMHCHEM/IMCYTCHM EA ADD ANTB: CPT | Performed by: PATHOLOGY

## 2019-08-30 RX ORDER — LIDOCAINE HYDROCHLORIDE AND EPINEPHRINE BITARTRATE 20; .01 MG/ML; MG/ML
20 INJECTION, SOLUTION SUBCUTANEOUS ONCE
Status: COMPLETED | OUTPATIENT
Start: 2019-08-30 | End: 2019-08-30

## 2019-08-30 RX ORDER — LIDOCAINE HYDROCHLORIDE 10 MG/ML
5 INJECTION, SOLUTION EPIDURAL; INFILTRATION; INTRACAUDAL; PERINEURAL ONCE
Status: COMPLETED | OUTPATIENT
Start: 2019-08-30 | End: 2019-08-30

## 2019-08-30 RX ADMIN — LIDOCAINE HYDROCHLORIDE,EPINEPHRINE BITARTRATE 20 ML: 20; .01 INJECTION, SOLUTION INFILTRATION; PERINEURAL at 09:55

## 2019-08-30 RX ADMIN — LIDOCAINE HYDROCHLORIDE,EPINEPHRINE BITARTRATE 20 ML: 20; .01 INJECTION, SOLUTION INFILTRATION; PERINEURAL at 09:50

## 2019-08-30 RX ADMIN — LIDOCAINE HYDROCHLORIDE 5 ML: 10 INJECTION, SOLUTION EPIDURAL; INFILTRATION; INTRACAUDAL; PERINEURAL at 09:55

## 2019-08-30 RX ADMIN — GADOBUTROL 8 ML: 604.72 INJECTION INTRAVENOUS at 09:39

## 2019-08-30 RX ADMIN — LIDOCAINE HYDROCHLORIDE 5 ML: 10 INJECTION, SOLUTION EPIDURAL; INFILTRATION; INTRACAUDAL; PERINEURAL at 09:50

## 2019-09-03 ENCOUNTER — TELEPHONE (OUTPATIENT)
Dept: GYNECOLOGY | Facility: CLINIC | Age: 64
End: 2019-09-03

## 2019-09-03 NOTE — TELEPHONE ENCOUNTER
----- Message from Maura Newton MD sent at 8/22/2019  3:23 PM EDT -----  Notify pt that her u/s of the breasts did not show anything to correlate with mammography findings  The radiologist recommends f/u bilateral diagnostic mammography in 6 months

## 2019-09-03 NOTE — PROGRESS NOTES
Post procedure call completed on 9/3/19 @ 1011    Bleeding: _____yes __x___no    Pain: _____yes ___x___no    Redness/Swelling: ______yes __x____no    Band aid removed: __x___yes _____no    Steri-Strips intact: ___x___yes _____no

## 2019-09-09 ENCOUNTER — OFFICE VISIT (OUTPATIENT)
Dept: SURGICAL ONCOLOGY | Facility: CLINIC | Age: 64
End: 2019-09-09
Payer: COMMERCIAL

## 2019-09-09 VITALS
RESPIRATION RATE: 18 BRPM | WEIGHT: 173 LBS | DIASTOLIC BLOOD PRESSURE: 80 MMHG | BODY MASS INDEX: 27.8 KG/M2 | HEART RATE: 85 BPM | HEIGHT: 66 IN | TEMPERATURE: 98.7 F | SYSTOLIC BLOOD PRESSURE: 130 MMHG

## 2019-09-09 DIAGNOSIS — N60.11 FIBROCYSTIC BREAST CHANGES, RIGHT: ICD-10-CM

## 2019-09-09 DIAGNOSIS — R92.8 ABNORMAL MRI, BREAST: ICD-10-CM

## 2019-09-09 DIAGNOSIS — D05.12 DUCTAL CARCINOMA IN SITU (DCIS) OF LEFT BREAST: Primary | ICD-10-CM

## 2019-09-09 PROCEDURE — 99215 OFFICE O/P EST HI 40 MIN: CPT | Performed by: SURGERY

## 2019-09-09 RX ORDER — TRAMADOL HYDROCHLORIDE 50 MG/1
50 TABLET ORAL EVERY 8 HOURS PRN
Qty: 9 TABLET | Refills: 0 | Status: SHIPPED | OUTPATIENT
Start: 2019-09-09 | End: 2020-10-09 | Stop reason: SDUPTHER

## 2019-09-09 RX ORDER — CEFAZOLIN SODIUM 1 G/50ML
1000 SOLUTION INTRAVENOUS ONCE
Status: CANCELLED | OUTPATIENT
Start: 2019-09-27 | End: 2019-09-09

## 2019-09-09 NOTE — H&P (VIEW-ONLY)
Surgical Oncology Follow Up       [facility] SURGICAL ONCOLOGY [facility] [id number]    Lul Car  [id number]  [id number]  [address]  CANCER [facility] SURGICAL ONCOLOGY [facility]    Chief Complaint   Patient presents with    Follow-up       Assessment/Plan   Diagnoses and all orders for this visit:    Ductal carcinoma in situ (DCIS) of left breast  -     traMADol (ULTRAM) 50 mg tablet; Take 1 tablet (50 mg total) by mouth every 8 (eight) hours as needed for moderate pain    Abnormal MRI, breast    Fibrocystic breast changes, right    Other orders  -     ceFAZolin (ANCEF) IVPB (premix) 1,000 mg        Advance Care Planning/Advance Directives:  Discussed disease status, cancer treatment plans and/or cancer treatment goals with the patient  Oncology History:       Ductal carcinoma in situ (DCIS) of left breast    9/9/2019 Initial Diagnosis     Ductal carcinoma in situ (DCIS) of left breast      9/9/2019 -  Cancer Staged     Staging form: Breast, AJCC 8th Edition  - Clinical: Stage 0 (cTis (DCIS), cN0, cM0, G1, ER: Unknown, ND: Unknown) - Signed by Phil Goodwin MD on 9/9/2019  Laterality: Left  Method of lymph node assessment: Clinical  Histologic grading system: 3 grade system           History of Present Illness:  Ductal carcinoma in situ of the left breast  -Interval History:  Bilateral MRI guided biopsies    Review of Systems:  Review of Systems   Constitutional: Negative  Negative for appetite change and fever  Eyes: Negative  Respiratory: Negative for shortness of breath  Cardiovascular: Negative  Gastrointestinal: Negative  Endocrine: Negative  Genitourinary: Negative  Musculoskeletal: Negative  Negative for arthralgias and myalgias  Skin: Negative  Allergic/Immunologic: Negative  Neurological: Negative  Hematological: Negative  Negative for adenopathy   Does not bruise/bleed easily  Psychiatric/Behavioral: Negative          Patient Active Problem List   Diagnosis    Abnormal MRI, breast    Abnormal mammogram of both breasts    Dense breast tissue    Increased risk of breast cancer    Ductal carcinoma in situ (DCIS) of left breast    Fibrocystic breast changes, right     Past Medical History:   Diagnosis Date    Anxiety     "time to time"    Arthritis     Dental crowns present     Eczema     Environmental and seasonal allergies     Food allergy     Hypertension     Irritable bowel syndrome     Lactose intolerance     Motion sickness     Osteoarthritis     mainly hands/feet/right hip    Osteopenia     Urinary leakage     Vaginal Pap smear 10/06/2017    WNL    Wears contact lenses     Wears glasses      Past Surgical History:   Procedure Laterality Date    BREAST BIOPSY Left     marker implanted    COLONOSCOPY      CYSTOSCOPY N/A 7/1/2019    Procedure: CYSTOSCOPY;  Surgeon: Raisa Bates MD;  [facility];  Service: UroGynecology           ENDOMETRIAL ABLATION      HAND SURGERY Left     cyst removed from palm    LIVER BIOPSY      x2 early 19's    MRI BREAST BIOPSY LEFT (ALL INCLUSIVE) Left 8/30/2019    MRI BREAST BIOPSY RIGHT (ALL INCLUSIVE) Right 8/30/2019    CT POST COLPORRHAPHY,RECTUM/VAGINA N/A 7/1/2019    Procedure: POSTERIOR COLPORRHAPHY;  Surgeon: Raisa Bates MD;  [facility];  Service: UroGynecology           PUBOVAGINAL SLING N/A 7/1/2019    Procedure: SINGLE INCISION SLING;  Surgeon: Raisa Bates MD;  [facility];  Service: UroGynecology           VAGINAL HYSTERECTOMY  01/01/2010    BSO AND REPAIR   UTEROVAGINAL PROLAPSE    WISDOM TOOTH EXTRACTION       Family History   Problem Relation Age of Onset    Lung cancer Family     Diabetes Family     Lung cancer Maternal Aunt     Lung cancer Father     No Known Problems Mother     No Known Problems Daughter     No Known Problems Maternal Grandmother     No Known Problems Maternal Grandfather     No Known Problems Paternal Grandmother     No Known Problems Paternal Grandfather     No Known Problems Daughter      Social History     Socioeconomic History    Marital status: /Civil Union     Spouse name: Not on file    Number of children: Not on file    Years of education: Not on file    Highest education level: Not on file   Occupational History    Not on file   Social Needs    Financial resource strain: Not on file    Food insecurity:     Worry: Not on file     Inability: Not on file    Transportation needs:     Medical: Not on file     Non-medical: Not on file   Tobacco Use    Smoking status: Never Smoker    Smokeless tobacco: Never Used   Substance and Sexual Activity    Alcohol use:  Yes     Alcohol/week: 1 0 standard drinks     Types: 1 Standard drinks or equivalent per week     Frequency: Monthly or less     Drinks per session: 1 or 2    Drug use: No    Sexual activity: Not on file   Lifestyle    Physical activity:     Days per week: Not on file     Minutes per session: Not on file    Stress: Not on file   Relationships    Social connections:     Talks on phone: Not on file     Gets together: Not on file     Attends Mandaeism service: Not on file     Active member of club or organization: Not on file     Attends meetings of clubs or organizations: Not on file     Relationship status: Not on file    Intimate partner violence:     Fear of current or ex partner: Not on file     Emotionally abused: Not on file     Physically abused: Not on file     Forced sexual activity: Not on file   Other Topics Concern    Not on file   Social History Narrative    Not on file       Current Outpatient Medications:     Cholecalciferol (VITAMIN D3) 5000 units CAPS, Take by mouth, Disp: , Rfl:     estradiol (ESTRACE) 1 mg tablet, Take 1 tablet (1 mg total) by mouth daily, Disp: 90 tablet, Rfl: 4    fexofenadine (ALLEGRA) 180 MG tablet, daily as needed , Disp: , Rfl:     hydrocortisone 0 5 % cream, Apply topically 2 (two) times a day as needed, Disp: , Rfl:     lisinopril (ZESTRIL) 10 mg tablet, 10 mg daily , Disp: , Rfl:     metoprolol tartrate (LOPRESSOR) 50 mg tablet, daily , Disp: , Rfl:     Cranberry (THERACRAN HP PO), Take 36 mg by mouth daily, Disp: , Rfl:     multivitamin-iron-minerals-folic acid (THERAPEUTIC-M) TABS tablet, Take 1 tablet by mouth daily, Disp: , Rfl:     traMADol (ULTRAM) 50 mg tablet, Take 1 tablet (50 mg total) by mouth every 8 (eight) hours as needed for moderate pain, Disp: 9 tablet, Rfl: 0  Allergies   Allergen Reactions    Meperidine Nausea Only, Anxiety and Palpitations    Pineapple Tongue Swelling    Shellfish-Derived Products Throat Swelling     Happens sometimes with combination of seafood    Strawberry C [Ascorbate]      Strawberries sometimes tongue swells    Lactose GI Intolerance     intolerance    Pollen Extract Itching and Nasal Congestion     Receives allergy shots also allergy to dust mites/insects    Tree Extract Allergic Rhinitis       The following portions of the patient's history were reviewed and updated as appropriate: allergies, current medications, past family history, past medical history, past social history, past surgical history and problem list         Vitals:    09/09/19 1519   BP: 130/80   Pulse: 85   Resp: 18   Temp: 98 7 °F (37 1 °C)       Physical Exam   Constitutional: She is oriented to person, place, and time  She appears well-developed and well-nourished  HENT:   Head: Normocephalic and atraumatic  Cardiovascular: Normal heart sounds  Pulmonary/Chest: Breath sounds normal  Right breast exhibits no inverted nipple, no mass, no nipple discharge, no skin change and no tenderness  Left breast exhibits no inverted nipple, no mass, no nipple discharge, no skin change and no tenderness     Well healing biopsy sites in the bilateral breasts with no signs of infection Abdominal: Soft  Lymphadenopathy:        Right axillary: No pectoral and no lateral adenopathy present  Left axillary: No pectoral and no lateral adenopathy present  Right: No supraclavicular adenopathy present  Left: No supraclavicular adenopathy present  Neurological: She is alert and oriented to person, place, and time  Psychiatric: She has a normal mood and affect  Results:  Labs:  MRI guided biopsy of the right breast done 08/30/2019 shows benign fibrocystic changes and is concordant; MRI guided biopsy of the left breast on 08/30/2019 shows ductal carcinoma in situ, receptors are pending    Imaging  Outside imaging review of her mammogram and MRI from [place] [id number]  were performed and a second-look ultrasound was recommended of the bilateral breast   This was performed and there were no correlates noted for the MRI findings  Therefore bilateral MRI guided biopsies were performed as noted above  I reviewed the above laboratory and imaging data  Discussion/Summary:  61-year-old female here today secondary to newly diagnosed ductal carcinoma in situ of the left breast   This was detected on an MRI guided biopsy  She had abnormalities in the bilateral breasts on MRI  There were no sonographic correlates  Therefore MRI guided biopsies were performed  The right side revealed benign fibrocystic changes  This was concordant in nature  The left side revealed low-grade ductal carcinoma in situ  Her receptors are pending  The patient is currently on oral estradiol  She inquired about discontinuing this  It is fine if she waits for her receptors to return before making this decision  She did discuss this already with her gynecologist   We also discussed the role of combined hormone replacement verses estrogen only  Regardless, if she is ER positive I will recommend that she discontinue this  She is a good candidate for breast conservation    I recommended a needle localized lumpectomy of the left breast   She understands that she may or may not need adjuvant radiation therapy  If she has DCIS only on her final pathology, I will do some additional testing to see if she will benefit from any radiation therapy  If she is ER positive, I will refer her to Medical Oncology to discuss adjuvant endocrine therapy  All of her questions were answered today  Consent was signed in the office

## 2019-09-09 NOTE — PROGRESS NOTES
Surgical Oncology Follow Up       [facility]  CANCER [facility] SURGICAL ONCOLOGY [facility] [address]    Cyndie Kevin  [birth year]  [id number]  [address]  CANCER [facility] SURGICAL ONCOLOGY [facility]    Chief Complaint   Patient presents with    Follow-up       Assessment/Plan   Diagnoses and all orders for this visit:    Ductal carcinoma in situ (DCIS) of left breast  -     traMADol (ULTRAM) 50 mg tablet; Take 1 tablet (50 mg total) by mouth every 8 (eight) hours as needed for moderate pain    Abnormal MRI, breast    Fibrocystic breast changes, right    Other orders  -     ceFAZolin (ANCEF) IVPB (premix) 1,000 mg        Advance Care Planning/Advance Directives:  Discussed disease status, cancer treatment plans and/or cancer treatment goals with the patient  Oncology History:       Ductal carcinoma in situ (DCIS) of left breast    9/9/2019 Initial Diagnosis     Ductal carcinoma in situ (DCIS) of left breast      9/9/2019 -  Cancer Staged     Staging form: Breast, AJCC 8th Edition  - Clinical: Stage 0 (cTis (DCIS), cN0, cM0, G1, ER: Unknown, IA: Unknown) - Signed by Naye Wang MD on 9/9/2019  Laterality: Left  Method of lymph node assessment: Clinical  Histologic grading system: 3 grade system           History of Present Illness:  Ductal carcinoma in situ of the left breast  -Interval History:  Bilateral MRI guided biopsies    Review of Systems:  Review of Systems   Constitutional: Negative  Negative for appetite change and fever  Eyes: Negative  Respiratory: Negative for shortness of breath  Cardiovascular: Negative  Gastrointestinal: Negative  Endocrine: Negative  Genitourinary: Negative  Musculoskeletal: Negative  Negative for arthralgias and myalgias  Skin: Negative  Allergic/Immunologic: Negative  Neurological: Negative  Hematological: Negative  Negative for adenopathy   Does not bruise/bleed easily  Psychiatric/Behavioral: Negative          Patient Active Problem List   Diagnosis    Abnormal MRI, breast    Abnormal mammogram of both breasts    Dense breast tissue    Increased risk of breast cancer    Ductal carcinoma in situ (DCIS) of left breast    Fibrocystic breast changes, right     Past Medical History:   Diagnosis Date    Anxiety     "time to time"    Arthritis     Dental crowns present     Eczema     Environmental and seasonal allergies     Food allergy     Hypertension     Irritable bowel syndrome     Lactose intolerance     Motion sickness     Osteoarthritis     mainly hands/feet/right hip    Osteopenia     Urinary leakage     Vaginal Pap smear 10/06/2017    WNL    Wears contact lenses     Wears glasses      Past Surgical History:   Procedure Laterality Date    BREAST BIOPSY Left     marker implanted    COLONOSCOPY      CYSTOSCOPY N/A 7/1/2019    Procedure: CYSTOSCOPY;  Surgeon: Yamilet Lackey MD;  [facility];  Service: UroGynecology           ENDOMETRIAL ABLATION      HAND SURGERY Left     cyst removed from palm    LIVER BIOPSY      x2 early 19's    MRI BREAST BIOPSY LEFT (ALL INCLUSIVE) Left 8/30/2019    MRI BREAST BIOPSY RIGHT (ALL INCLUSIVE) Right 8/30/2019    AR POST COLPORRHAPHY,RECTUM/VAGINA N/A 7/1/2019    Procedure: POSTERIOR COLPORRHAPHY;  Surgeon: Yamilet Lackey MD;  [facility];  Service: UroGynecology           PUBOVAGINAL SLING N/A 7/1/2019    Procedure: SINGLE INCISION SLING;  Surgeon: Yamilet Lackey MD;  [facility];  Service: UroGynecology           VAGINAL HYSTERECTOMY  01/01/2010    BSO AND REPAIR   UTEROVAGINAL PROLAPSE    WISDOM TOOTH EXTRACTION       Family History   Problem Relation Age of Onset    Lung cancer Family     Diabetes Family     Lung cancer Maternal Aunt     Lung cancer Father     No Known Problems Mother     No Known Problems Daughter     No Known Problems Maternal Grandmother     No Known Problems Maternal Grandfather     No Known Problems Paternal Grandmother     No Known Problems Paternal Grandfather     No Known Problems Daughter      Social History     Socioeconomic History    Marital status: /Civil Union     Spouse name: Not on file    Number of children: Not on file    Years of education: Not on file    Highest education level: Not on file   Occupational History    Not on file   Social Needs    Financial resource strain: Not on file    Food insecurity:     Worry: Not on file     Inability: Not on file    Transportation needs:     Medical: Not on file     Non-medical: Not on file   Tobacco Use    Smoking status: Never Smoker    Smokeless tobacco: Never Used   Substance and Sexual Activity    Alcohol use:  Yes     Alcohol/week: 1 0 standard drinks     Types: 1 Standard drinks or equivalent per week     Frequency: Monthly or less     Drinks per session: 1 or 2    Drug use: No    Sexual activity: Not on file   Lifestyle    Physical activity:     Days per week: Not on file     Minutes per session: Not on file    Stress: Not on file   Relationships    Social connections:     Talks on phone: Not on file     Gets together: Not on file     Attends Bahai service: Not on file     Active member of club or organization: Not on file     Attends meetings of clubs or organizations: Not on file     Relationship status: Not on file    Intimate partner violence:     Fear of current or ex partner: Not on file     Emotionally abused: Not on file     Physically abused: Not on file     Forced sexual activity: Not on file   Other Topics Concern    Not on file   Social History Narrative    Not on file       Current Outpatient Medications:     Cholecalciferol (VITAMIN D3) 5000 units CAPS, Take by mouth, Disp: , Rfl:     estradiol (ESTRACE) 1 mg tablet, Take 1 tablet (1 mg total) by mouth daily, Disp: 90 tablet, Rfl: 4    fexofenadine (ALLEGRA) 180 MG tablet, daily as needed , Disp: , Rfl:     hydrocortisone 0 5 % cream, Apply topically 2 (two) times a day as needed, Disp: , Rfl:     lisinopril (ZESTRIL) 10 mg tablet, 10 mg daily , Disp: , Rfl:     metoprolol tartrate (LOPRESSOR) 50 mg tablet, daily , Disp: , Rfl:     Cranberry (THERACRAN HP PO), Take 36 mg by mouth daily, Disp: , Rfl:     multivitamin-iron-minerals-folic acid (THERAPEUTIC-M) TABS tablet, Take 1 tablet by mouth daily, Disp: , Rfl:     traMADol (ULTRAM) 50 mg tablet, Take 1 tablet (50 mg total) by mouth every 8 (eight) hours as needed for moderate pain, Disp: 9 tablet, Rfl: 0  Allergies   Allergen Reactions    Meperidine Nausea Only, Anxiety and Palpitations    Pineapple Tongue Swelling    Shellfish-Derived Products Throat Swelling     Happens sometimes with combination of seafood    Strawberry C [Ascorbate]      Strawberries sometimes tongue swells    Lactose GI Intolerance     intolerance    Pollen Extract Itching and Nasal Congestion     Receives allergy shots also allergy to dust mites/insects    Tree Extract Allergic Rhinitis       The following portions of the patient's history were reviewed and updated as appropriate: allergies, current medications, past family history, past medical history, past social history, past surgical history and problem list         Vitals:    09/09/19 1519   BP: 130/80   Pulse: 85   Resp: 18   Temp: 98 7 °F (37 1 °C)       Physical Exam   Constitutional: She is oriented to person, place, and time  She appears well-developed and well-nourished  HENT:   Head: Normocephalic and atraumatic  Cardiovascular: Normal heart sounds  Pulmonary/Chest: Breath sounds normal  Right breast exhibits no inverted nipple, no mass, no nipple discharge, no skin change and no tenderness  Left breast exhibits no inverted nipple, no mass, no nipple discharge, no skin change and no tenderness     Well healing biopsy sites in the bilateral breasts with no signs of infection Abdominal: Soft  Lymphadenopathy:        Right axillary: No pectoral and no lateral adenopathy present  Left axillary: No pectoral and no lateral adenopathy present  Right: No supraclavicular adenopathy present  Left: No supraclavicular adenopathy present  Neurological: She is alert and oriented to person, place, and time  Psychiatric: She has a normal mood and affect  Results:  Labs:  MRI guided biopsy of the right breast done 08/30/2019 shows benign fibrocystic changes and is concordant; MRI guided biopsy of the left breast on 08/30/2019 shows ductal carcinoma in situ, receptors are pending    Imaging  Outside imaging review of her mammogram and MRI from [place] [address]  were performed and a second-look ultrasound was recommended of the bilateral breast   This was performed and there were no correlates noted for the MRI findings  Therefore bilateral MRI guided biopsies were performed as noted above  I reviewed the above laboratory and imaging data  Discussion/Summary:  56-year-old female here today secondary to newly diagnosed ductal carcinoma in situ of the left breast   This was detected on an MRI guided biopsy  She had abnormalities in the bilateral breasts on MRI  There were no sonographic correlates  Therefore MRI guided biopsies were performed  The right side revealed benign fibrocystic changes  This was concordant in nature  The left side revealed low-grade ductal carcinoma in situ  Her receptors are pending  The patient is currently on oral estradiol  She inquired about discontinuing this  It is fine if she waits for her receptors to return before making this decision  She did discuss this already with her gynecologist   We also discussed the role of combined hormone replacement verses estrogen only  Regardless, if she is ER positive I will recommend that she discontinue this  She is a good candidate for breast conservation    I recommended a needle localized lumpectomy of the left breast   She understands that she may or may not need adjuvant radiation therapy  If she has DCIS only on her final pathology, I will do some additional testing to see if she will benefit from any radiation therapy  If she is ER positive, I will refer her to Medical Oncology to discuss adjuvant endocrine therapy  All of her questions were answered today  Consent was signed in the office

## 2019-09-15 ENCOUNTER — ANESTHESIA EVENT (OUTPATIENT)
Dept: PERIOP | Facility: HOSPITAL | Age: 64
End: 2019-09-15
Payer: COMMERCIAL

## 2019-09-15 RX ORDER — SODIUM CHLORIDE 9 MG/ML
125 INJECTION, SOLUTION INTRAVENOUS CONTINUOUS
Status: CANCELLED | OUTPATIENT
Start: 2019-09-27

## 2019-09-16 ENCOUNTER — APPOINTMENT (OUTPATIENT)
Dept: PREADMISSION TESTING | Facility: HOSPITAL | Age: 64
End: 2019-09-16
Payer: COMMERCIAL

## 2019-09-16 ENCOUNTER — HOSPITAL ENCOUNTER (OUTPATIENT)
Dept: RADIOLOGY | Facility: HOSPITAL | Age: 64
Discharge: HOME | End: 2019-09-16
Attending: SURGERY
Payer: COMMERCIAL

## 2019-09-16 ENCOUNTER — APPOINTMENT (OUTPATIENT)
Dept: LAB | Facility: HOSPITAL | Age: 64
End: 2019-09-16
Attending: SURGERY
Payer: COMMERCIAL

## 2019-09-16 ENCOUNTER — HOSPITAL ENCOUNTER (OUTPATIENT)
Dept: NON INVASIVE DIAGNOSTICS | Facility: HOSPITAL | Age: 64
Discharge: HOME | End: 2019-09-16
Attending: SURGERY
Payer: COMMERCIAL

## 2019-09-16 DIAGNOSIS — D05.12 DUCTAL CARCINOMA IN SITU (DCIS) OF LEFT BREAST: ICD-10-CM

## 2019-09-16 LAB
ALBUMIN SERPL BCP-MCNC: 3.6 G/DL (ref 3.5–5)
ALP SERPL-CCNC: 48 U/L (ref 46–116)
ALT SERPL W P-5'-P-CCNC: 21 U/L (ref 12–78)
ANION GAP SERPL CALCULATED.3IONS-SCNC: 8 MMOL/L (ref 4–13)
APTT PPP: 24 SECONDS (ref 23–37)
AST SERPL W P-5'-P-CCNC: 16 U/L (ref 5–45)
ATRIAL RATE: 61 BPM
BASOPHILS # BLD AUTO: 0.06 THOUSANDS/ΜL (ref 0–0.1)
BASOPHILS NFR BLD AUTO: 1 % (ref 0–1)
BILIRUB SERPL-MCNC: 0.98 MG/DL (ref 0.2–1)
BILIRUB UR QL STRIP: NEGATIVE
BUN SERPL-MCNC: 8 MG/DL (ref 5–25)
CALCIUM SERPL-MCNC: 9.3 MG/DL (ref 8.3–10.1)
CHLORIDE SERPL-SCNC: 105 MMOL/L (ref 100–108)
CLARITY UR: CLEAR
CO2 SERPL-SCNC: 27 MMOL/L (ref 21–32)
COLOR UR: COLORLESS
CREAT SERPL-MCNC: 0.7 MG/DL (ref 0.6–1.3)
EOSINOPHIL # BLD AUTO: 0.12 THOUSAND/ΜL (ref 0–0.61)
EOSINOPHIL NFR BLD AUTO: 2 % (ref 0–6)
ERYTHROCYTE [DISTWIDTH] IN BLOOD BY AUTOMATED COUNT: 11.4 % (ref 11.6–15.1)
GFR SERPL CREATININE-BSD FRML MDRD: 93 ML/MIN/1.73SQ M
GLUCOSE P FAST SERPL-MCNC: 93 MG/DL (ref 65–99)
GLUCOSE UR STRIP-MCNC: NEGATIVE MG/DL
HCT VFR BLD AUTO: 41.8 % (ref 34.8–46.1)
HGB BLD-MCNC: 13.6 G/DL (ref 11.5–15.4)
HGB UR QL STRIP.AUTO: NEGATIVE
IMM GRANULOCYTES # BLD AUTO: 0.02 THOUSAND/UL (ref 0–0.2)
IMM GRANULOCYTES NFR BLD AUTO: 0 % (ref 0–2)
INR PPP: 0.99 (ref 0.84–1.19)
KETONES UR STRIP-MCNC: NEGATIVE MG/DL
LEUKOCYTE ESTERASE UR QL STRIP: NEGATIVE
LYMPHOCYTES # BLD AUTO: 2.15 THOUSANDS/ΜL (ref 0.6–4.47)
LYMPHOCYTES NFR BLD AUTO: 29 % (ref 14–44)
MCH RBC QN AUTO: 31.2 PG (ref 26.8–34.3)
MCHC RBC AUTO-ENTMCNC: 32.5 G/DL (ref 31.4–37.4)
MCV RBC AUTO: 96 FL (ref 82–98)
MONOCYTES # BLD AUTO: 0.47 THOUSAND/ΜL (ref 0.17–1.22)
MONOCYTES NFR BLD AUTO: 6 % (ref 4–12)
NEUTROPHILS # BLD AUTO: 4.59 THOUSANDS/ΜL (ref 1.85–7.62)
NEUTS SEG NFR BLD AUTO: 62 % (ref 43–75)
NITRITE UR QL STRIP: NEGATIVE
NRBC BLD AUTO-RTO: 0 /100 WBCS
P AXIS: 62 DEGREES
PH UR STRIP.AUTO: 7 [PH]
PLATELET # BLD AUTO: 242 THOUSANDS/UL (ref 149–390)
PMV BLD AUTO: 10.9 FL (ref 8.9–12.7)
POTASSIUM SERPL-SCNC: 3.9 MMOL/L (ref 3.5–5.3)
PR INTERVAL: 168 MS
PROT SERPL-MCNC: 7 G/DL (ref 6.4–8.2)
PROT UR STRIP-MCNC: NEGATIVE MG/DL
PROTHROMBIN TIME: 13.2 SECONDS (ref 11.6–14.5)
QRS AXIS: 26 DEGREES
QRSD INTERVAL: 74 MS
QT INTERVAL: 424 MS
QTC INTERVAL: 426 MS
RBC # BLD AUTO: 4.36 MILLION/UL (ref 3.81–5.12)
SODIUM SERPL-SCNC: 140 MMOL/L (ref 136–145)
SP GR UR STRIP.AUTO: <=1.005 (ref 1–1.03)
T WAVE AXIS: 52 DEGREES
UROBILINOGEN UR QL STRIP.AUTO: 0.2 E.U./DL
VENTRICULAR RATE: 61 BPM
WBC # BLD AUTO: 7.41 THOUSAND/UL (ref 4.31–10.16)

## 2019-09-16 PROCEDURE — 80053 COMPREHEN METABOLIC PANEL: CPT

## 2019-09-16 PROCEDURE — 85730 THROMBOPLASTIN TIME PARTIAL: CPT

## 2019-09-16 PROCEDURE — 71046 X-RAY EXAM CHEST 2 VIEWS: CPT

## 2019-09-16 PROCEDURE — 85025 COMPLETE CBC W/AUTO DIFF WBC: CPT

## 2019-09-16 PROCEDURE — 36415 COLL VENOUS BLD VENIPUNCTURE: CPT

## 2019-09-16 PROCEDURE — 93005 ELECTROCARDIOGRAM TRACING: CPT | Performed by: SURGERY

## 2019-09-16 PROCEDURE — 81003 URINALYSIS AUTO W/O SCOPE: CPT | Performed by: SURGERY

## 2019-09-16 PROCEDURE — 93010 ELECTROCARDIOGRAM REPORT: CPT | Performed by: INTERNAL MEDICINE

## 2019-09-16 PROCEDURE — 85610 PROTHROMBIN TIME: CPT

## 2019-09-16 NOTE — PRE-PROCEDURE INSTRUCTIONS
Pre-Surgery Instructions:   Medication Instructions    Cholecalciferol (VITAMIN D3) 5000 units CAPS Patient was instructed by Physician and understands   estradiol (ESTRACE) 1 mg tablet Patient was instructed by Physician and understands   fexofenadine (ALLEGRA) 180 MG tablet Patient was instructed by Physician and understands   hydrocortisone 0 5 % cream Patient was instructed by Physician and understands   lisinopril (ZESTRIL) 10 mg tablet Patient was instructed by Physician and understands   metoprolol tartrate (LOPRESSOR) 50 mg tablet Patient was instructed by Physician and understands  Patient seen by Claudio Monk instructed *to take metoprolol*with a sip of water the morning of surgery  Patient given/ instructed on use of chlorhexidine soap per hospital protocol    Patient instructed to stop all ASA, NSAIDS, vitamins and herbal supplements one week prior to surgery or per Dr Gaspar Nyhan

## 2019-09-16 NOTE — ANESTHESIA PREPROCEDURE EVALUATION
Review of Systems/Medical History  Patient summary reviewed  Chart reviewed  No history of anesthetic complications     Cardiovascular  Hypertension controlled, Dysrhythmias (tachycardia--? food allergy mediated?) ,    Pulmonary  Negative pulmonary ROS        GI/Hepatic  Negative GI/hepatic ROS     Comment: Gluten sensitivity  IBS     Negative  ROS        Endo/Other  Negative endo/other ROS      GYN  Negative gynecology ROS          Hematology  Negative hematology ROS      Musculoskeletal    Arthritis     Neurology  Negative neurology ROS      Psychology   Anxiety,              Physical Exam    Airway    Mallampati score: II  TM Distance: >3 FB  Neck ROM: full     Dental   No notable dental hx     Cardiovascular  Rhythm: regular, Rate: normal, Cardiovascular exam normal    Pulmonary  Pulmonary exam normal Breath sounds clear to auscultation,     Other Findings        Anesthesia Plan  ASA Score- 2     Anesthesia Type- general with ASA Monitors  Additional Monitors:   Airway Plan: LMA  Plan Factors-Patient not instructed to abstain from smoking on day of procedure  Patient did not smoke on day of surgery  Induction- intravenous  Postoperative Plan-     Informed Consent- Anesthetic plan and risks discussed with patient and spouse

## 2019-09-26 ENCOUNTER — TELEPHONE (OUTPATIENT)
Dept: SURGICAL ONCOLOGY | Facility: CLINIC | Age: 64
End: 2019-09-26

## 2019-09-26 NOTE — TELEPHONE ENCOUNTER
----- Message from [place] sent at 9/26/2019 10:15 AM EDT -----  Regarding: Re Patient call  Contact: [phone]  Please call pt  She states she has a few questions in regards to medication before her scheduled lumpectomy tomorrow 9/27       Thank You

## 2019-09-26 NOTE — TELEPHONE ENCOUNTER
Pt called with question as to whether she should discontinue her Estrace oral medication  Spoke with Dr Fernanda Castro and she recommended pt stop the Estrace  Pt understands

## 2019-09-27 ENCOUNTER — HOSPITAL ENCOUNTER (OUTPATIENT)
Dept: MAMMOGRAPHY | Facility: HOSPITAL | Age: 64
Discharge: HOME | End: 2019-09-27
Attending: SURGERY
Payer: COMMERCIAL

## 2019-09-27 ENCOUNTER — HOSPITAL ENCOUNTER (OUTPATIENT)
Facility: HOSPITAL | Age: 64
Setting detail: OUTPATIENT SURGERY
Discharge: HOME | End: 2019-09-27
Attending: SURGERY | Admitting: SURGERY
Payer: COMMERCIAL

## 2019-09-27 ENCOUNTER — HOSPITAL ENCOUNTER (OUTPATIENT)
Dept: MAMMOGRAPHY | Facility: HOSPITAL | Age: 64
End: 2019-09-27
Attending: SURGERY
Payer: COMMERCIAL

## 2019-09-27 ENCOUNTER — HOSPITAL ENCOUNTER (OUTPATIENT)
Dept: MAMMOGRAPHY | Facility: HOSPITAL | Age: 64
Setting detail: OUTPATIENT SURGERY
Discharge: HOME | End: 2019-09-27
Payer: COMMERCIAL

## 2019-09-27 ENCOUNTER — ANESTHESIA (OUTPATIENT)
Dept: PERIOP | Facility: HOSPITAL | Age: 64
End: 2019-09-27
Payer: COMMERCIAL

## 2019-09-27 VITALS
RESPIRATION RATE: 16 BRPM | DIASTOLIC BLOOD PRESSURE: 74 MMHG | TEMPERATURE: 97.9 F | WEIGHT: 173.4 LBS | SYSTOLIC BLOOD PRESSURE: 150 MMHG | HEIGHT: 66 IN | BODY MASS INDEX: 27.87 KG/M2 | OXYGEN SATURATION: 95 % | HEART RATE: 72 BPM

## 2019-09-27 DIAGNOSIS — D05.12 DUCTAL CARCINOMA IN SITU (DCIS) OF LEFT BREAST: ICD-10-CM

## 2019-09-27 PROCEDURE — 19301 PARTIAL MASTECTOMY: CPT | Performed by: SURGERY

## 2019-09-27 PROCEDURE — 88307 TISSUE EXAM BY PATHOLOGIST: CPT | Performed by: PATHOLOGY

## 2019-09-27 PROCEDURE — 19281 PERQ DEVICE BREAST 1ST IMAG: CPT

## 2019-09-27 RX ORDER — MIDAZOLAM HYDROCHLORIDE 1 MG/ML
INJECTION INTRAMUSCULAR; INTRAVENOUS AS NEEDED
Status: DISCONTINUED | OUTPATIENT
Start: 2019-09-27 | End: 2019-09-27 | Stop reason: SURG

## 2019-09-27 RX ORDER — ONDANSETRON 2 MG/ML
INJECTION INTRAMUSCULAR; INTRAVENOUS AS NEEDED
Status: DISCONTINUED | OUTPATIENT
Start: 2019-09-27 | End: 2019-09-27 | Stop reason: SURG

## 2019-09-27 RX ORDER — DEXAMETHASONE SODIUM PHOSPHATE 10 MG/ML
INJECTION, SOLUTION INTRAMUSCULAR; INTRAVENOUS AS NEEDED
Status: DISCONTINUED | OUTPATIENT
Start: 2019-09-27 | End: 2019-09-27 | Stop reason: SURG

## 2019-09-27 RX ORDER — ONDANSETRON 2 MG/ML
4 INJECTION INTRAMUSCULAR; INTRAVENOUS ONCE AS NEEDED
Status: DISCONTINUED | OUTPATIENT
Start: 2019-09-27 | End: 2019-09-27 | Stop reason: HOSPADM

## 2019-09-27 RX ORDER — LIDOCAINE HYDROCHLORIDE 10 MG/ML
INJECTION, SOLUTION INFILTRATION; PERINEURAL AS NEEDED
Status: DISCONTINUED | OUTPATIENT
Start: 2019-09-27 | End: 2019-09-27 | Stop reason: SURG

## 2019-09-27 RX ORDER — FENTANYL CITRATE 50 UG/ML
INJECTION, SOLUTION INTRAMUSCULAR; INTRAVENOUS AS NEEDED
Status: DISCONTINUED | OUTPATIENT
Start: 2019-09-27 | End: 2019-09-27 | Stop reason: SURG

## 2019-09-27 RX ORDER — BUPIVACAINE HYDROCHLORIDE 5 MG/ML
INJECTION, SOLUTION PERINEURAL AS NEEDED
Status: DISCONTINUED | OUTPATIENT
Start: 2019-09-27 | End: 2019-09-27 | Stop reason: HOSPADM

## 2019-09-27 RX ORDER — IBUPROFEN 600 MG/1
600 TABLET ORAL EVERY 8 HOURS PRN
Status: DISCONTINUED | OUTPATIENT
Start: 2019-09-27 | End: 2019-09-27 | Stop reason: HOSPADM

## 2019-09-27 RX ORDER — CEFAZOLIN SODIUM 1 G/50ML
1000 SOLUTION INTRAVENOUS ONCE
Status: COMPLETED | OUTPATIENT
Start: 2019-09-27 | End: 2019-09-27

## 2019-09-27 RX ORDER — PROPOFOL 10 MG/ML
INJECTION, EMULSION INTRAVENOUS AS NEEDED
Status: DISCONTINUED | OUTPATIENT
Start: 2019-09-27 | End: 2019-09-27 | Stop reason: SURG

## 2019-09-27 RX ORDER — TRAMADOL HYDROCHLORIDE 50 MG/1
50 TABLET ORAL EVERY 6 HOURS PRN
Status: DISCONTINUED | OUTPATIENT
Start: 2019-09-27 | End: 2019-09-27 | Stop reason: HOSPADM

## 2019-09-27 RX ORDER — SODIUM CHLORIDE 9 MG/ML
125 INJECTION, SOLUTION INTRAVENOUS CONTINUOUS
Status: DISCONTINUED | OUTPATIENT
Start: 2019-09-27 | End: 2019-09-27 | Stop reason: HOSPADM

## 2019-09-27 RX ORDER — HYDROMORPHONE HCL/PF 1 MG/ML
0.5 SYRINGE (ML) INJECTION
Status: DISCONTINUED | OUTPATIENT
Start: 2019-09-27 | End: 2019-09-27 | Stop reason: HOSPADM

## 2019-09-27 RX ORDER — LIDOCAINE WITH 8.4% SOD BICARB 0.9%(10ML)
5 SYRINGE (ML) INJECTION ONCE
Status: COMPLETED | OUTPATIENT
Start: 2019-09-27 | End: 2019-09-27

## 2019-09-27 RX ORDER — MAGNESIUM HYDROXIDE 1200 MG/15ML
LIQUID ORAL AS NEEDED
Status: DISCONTINUED | OUTPATIENT
Start: 2019-09-27 | End: 2019-09-27 | Stop reason: HOSPADM

## 2019-09-27 RX ADMIN — FENTANYL CITRATE 50 MCG: 50 INJECTION, SOLUTION INTRAMUSCULAR; INTRAVENOUS at 13:46

## 2019-09-27 RX ADMIN — CEFAZOLIN SODIUM 1000 MG: 1 SOLUTION INTRAVENOUS at 13:35

## 2019-09-27 RX ADMIN — FENTANYL CITRATE 50 MCG: 50 INJECTION, SOLUTION INTRAMUSCULAR; INTRAVENOUS at 13:51

## 2019-09-27 RX ADMIN — DEXAMETHASONE SODIUM PHOSPHATE 10 MG: 10 INJECTION, SOLUTION INTRAMUSCULAR; INTRAVENOUS at 13:52

## 2019-09-27 RX ADMIN — Medication 5 ML: at 12:30

## 2019-09-27 RX ADMIN — LIDOCAINE HYDROCHLORIDE 100 MG: 10 INJECTION, SOLUTION INFILTRATION; PERINEURAL at 13:44

## 2019-09-27 RX ADMIN — TRAMADOL HYDROCHLORIDE 50 MG: 50 TABLET, FILM COATED ORAL at 16:13

## 2019-09-27 RX ADMIN — MIDAZOLAM 2 MG: 1 INJECTION INTRAMUSCULAR; INTRAVENOUS at 13:35

## 2019-09-27 RX ADMIN — PROPOFOL 200 MG: 10 INJECTION, EMULSION INTRAVENOUS at 13:44

## 2019-09-27 RX ADMIN — SODIUM CHLORIDE 125 ML/HR: 0.9 INJECTION, SOLUTION INTRAVENOUS at 11:34

## 2019-09-27 RX ADMIN — ONDANSETRON 4 MG: 2 INJECTION INTRAMUSCULAR; INTRAVENOUS at 13:52

## 2019-09-27 NOTE — INTERVAL H&P NOTE
H&P reviewed  After examining the patient I find no changes in the patients condition since the H&P had been written      Vitals:    09/27/19 1110   BP: 159/79   Pulse: 70   Resp: 16   Temp: 98 1 °F (36 7 °C)   SpO2: 100%

## 2019-09-27 NOTE — ANESTHESIA POSTPROCEDURE EVALUATION
Post-Op Assessment Note    CV Status:  Stable    Pain management: adequate     Mental Status:  Alert and awake   Hydration Status:  Euvolemic   PONV Controlled:  Controlled   Airway Patency:  Patent   Post Op Vitals Reviewed: Yes      Staff: Anesthesiologist           /64 (09/27/19 1501)    Temp      Pulse 84 (09/27/19 1501)   Resp 20 (09/27/19 1501)    SpO2 95 % (09/27/19 1501)

## 2019-09-27 NOTE — OP NOTE
OPERATIVE REPORT  PATIENT NAME: Lilia Guzman    :  1955  MRN: [id number]  Pt Location: [facility]    SURGERY DATE: 2019    Surgeon(s) and Role:     * Jeovany Tyler MD - Primary    Preop Diagnosis:  Ductal carcinoma in situ (DCIS) of left breast [D05 12]    Post-Op Diagnosis Codes:     * Ductal carcinoma in situ (DCIS) of left breast [D05 12]    Procedure(s) (LRB):  LUMPECTOMY BREAST NEEDLE LOCALIZED; 1200 NEEDLE LOC (Left)    Specimen(s):  ID Type Source Tests Collected by Time Destination   1 : left breat lumpectomy short stitch superior long  stitch lateral Tissue Breast, Left TISSUE EXAM Jeovany Tyler MD 2019 1405    2 : new margin inferior Tissue Breast, Left TISSUE EXAM Jeovany Tyler MD 2019 1415    3 : new margin lateral Tissue Breast, Left TISSUE EXAM Jeovany Tyler MD 2019 1417    4 : new margin posterior  Tissue Breast, Left TISSUE EXAM Jeovany Tyler MD 2019 1418        Estimated Blood Loss:   Minimal    Drains:  * No LDAs found *    Anesthesia Type:   General    Operative Indications:  Ductal carcinoma in situ (DCIS) of left breast [D05 12]      Operative Findings:  Clip and wire in specimen    Complications:   None    Procedure and Technique:  Ja Haynes is a 73-year-old female who presented to my office with ductal carcinoma in situ of the left breast   She was counseled on breast conservation  She presented the day of surgery to the radiology suite and underwent needle localization of the left breast   From there, she went to the operating room  She was given general anesthesia  She was prepped and draped in the usual standard fashion  Time-out was performed  Attention was turned to the upper left breast   0 5% Marcaine plain was used for supplemental anesthesia  An elliptical incision was created slightly inferior and lateral to the localization wire    Electrocautery was then used to dissect superior and medial to the level of the wire, which was then elevated into the wound  A broad margin of tissue was then excised superior, lateral, inferior, medial and posterior  This was marked with a short stitch superior and a long stitch lateral   This was imaged in the operating room  The prior biopsy clip and intact wire were present  The closest margins were lateral, inferior and posterior  Therefore new margins were excised in these locations and sutures were placed on the true margins  All breast specimens were sent to pathology in formalin  Her wound was irrigated and hemostasis was achieved  Hemoclips were placed within the lumpectomy cavity for potential radiation therapy  The wound was then closed in layered fashion using interrupted 3-0 Monocryl suture and a running 4-0 Monocryl subcuticular stitch  The skin was cleaned and dried  Surgical glue, fluffs and a bra were applied  All counts were correct  The patient was extubated and taken to recovery in stable condition      Patient Disposition:  extubated and stable    SIGNATURE: Ashli Patrick MD  DATE: September 27, 2019  TIME: 2:45 PM

## 2019-09-27 NOTE — DISCHARGE INSTRUCTIONS
POST-OPERATIVE CARE INSTRUCTIONS       Care after your procedure:   General  · Rest and relax for 24 hours, then gradually return to normal activities  · Do not preform any heavy lifting or strenuous physical activities for 14 days  · Your activity restrictions will be re-evaluated at your post op visit  · Drink clear liquids until you are certain there is no nausea, then resume a normal diet  · Do not drink alcohol, drive any vehicle, operate mechanical equipment or make critical decisions for at least 24 hours and until you are off any narcotic pain medications  The Incision  · Your incision is closed with:   dissolvable stiches just underneath the skin  · The incision is also covered with:                          clear waterproof glue  · A gauze-pad is covering the wound  Wound care  · Remove your gauze-pad after 24 hours  · You may then shower using soap and water to clean your incision  Gently dry the wound  · You may redress your wound with additional gauze and tape if you choose  · A little bruising at the wound site is normal     Medication  · Resume all previous medications  · Take either Naproxen (Aleve) one tablet every 8 hours or Ibuprofen(Advil/Motrin) one(1) to two(2) tablets every 6 hours around the clock for the first 2-3 days  Take this even if you don't think you need it for at least the first 24 hours  · Pain Medication Instructions: tramadol as prescribed, may use over the counter tylenol instead          Other (If applicable)  · Wear a post-surgical bra around the clock  · May use ice to the incision site(s) for the next 24-48 hours, twice daily     Call your  doctor if you have any of the following:  · Redness, swelling, heat, drainage, and/or bleeding from your wound  · Chills or fever ( above 101' F )  · Pain, not relieved with the above medications  · If you have any questions or problems call our office [phone]    Follow-up appointment:  · As scheduled

## 2019-10-15 ENCOUNTER — TELEPHONE (OUTPATIENT)
Dept: SURGICAL ONCOLOGY | Facility: CLINIC | Age: 64
End: 2019-10-15

## 2019-10-15 ENCOUNTER — OFFICE VISIT (OUTPATIENT)
Dept: SURGICAL ONCOLOGY | Facility: CLINIC | Age: 64
End: 2019-10-15

## 2019-10-15 VITALS
HEART RATE: 72 BPM | BODY MASS INDEX: 27.77 KG/M2 | HEIGHT: 66 IN | TEMPERATURE: 98.5 F | SYSTOLIC BLOOD PRESSURE: 126 MMHG | WEIGHT: 172.8 LBS | DIASTOLIC BLOOD PRESSURE: 70 MMHG | RESPIRATION RATE: 16 BRPM

## 2019-10-15 DIAGNOSIS — D05.12 DUCTAL CARCINOMA IN SITU (DCIS) OF LEFT BREAST: Primary | ICD-10-CM

## 2019-10-15 PROCEDURE — 99024 POSTOP FOLLOW-UP VISIT: CPT | Performed by: SURGERY

## 2019-10-15 NOTE — TELEPHONE ENCOUNTER
New Patient Encounter    New Patient Intake Form   Patient Details:  Zee Leahy  1955  [id number]    Background Information:  [address] starts by opening a telephone encounter and gathering the following information   Who is calling to schedule? If not self, relationship to patient? [facility] office    Referring Provider Kathleen Parikh   What is the diagnosis? DCIS   When was the diagnosis? 10/2019   Is patient aware of diagnosis? Yes   Reason for visit? NP DX   Have you had any testing done? If so: when, where? Yes   Are records in [facility]? yes   Was the patient told to bring a disk? no   Scheduling Information:   Preferred [place]:  [place]     Requesting Specific Provider? NO   Are there any dates/time the patient cannot be seen? NO   Counseling Pre-Screen:  If the patient answers YES to any of the below questions, please route to the appropriate location specific counselor    Have you felt anxious or worried about cancer and the treatment you are receiving? Did Not Speak to Patient   Has your diagnosis caused physical, emotional, or financial hardship for you? Did Not Speak to Patient   Note: Do not ask the patient about transportation issues/needs  Please notate if the patient brings it up and the counselor will schedule accordingly  Miscellaneous: NA    After completing the above information, please route to Financial Counselor and the appropriate Nurse Navigator for review

## 2019-10-15 NOTE — PROGRESS NOTES
59 y o  female is here today s/p left breast lumpectomy on 19  She reports mild discomfort at surgical site  Her incision is dry, intact and healing well  Physical Exam   Constitutional: She appears well-developed and well-nourished  Pulmonary/Chest: Left breast exhibits skin change (Well-healing incision with no signs of infection)  Neurological: She is alert  Psychiatric: She has a normal mood and affect  Data:       Stagin mm residual, 3mm on biopsy DCIS  Tumor grade 1  Margins clean  Estrogen receptor and progesterone receptor status positive      Stage: 0        Diagnoses and all orders for this visit:    Ductal carcinoma in situ (DCIS) of left breast  -     Ambulatory referral to Hematology / Oncology; Future        Assessment/Plan:  81-year-old female status post left lumpectomy for ductal carcinoma in situ  Her final pathology shows the same  She is healing well with no signs of infection  This was ER/KS positive  I am referring her to Medical Oncology for consultation  I am also referring her to clinical trials for the prelude registry  I will call her with these results  Further recommendations will be made about radiation following this  I will otherwise see her again as scheduled in February or sooner should the need arise

## 2019-10-21 NOTE — TELEPHONE ENCOUNTER
Called insurance co & spoke to veronica call ref# [id number]  Pt has an active [facility] choice pos plan effective 01/01/16 that runs on a paul year  This is primary & not a cobra plan  Pt has chemo & radiation benefits that are the same $1000 deduct that has been met so she's covered at 100%  Out of pocket is $2000 met $7199 03  No co-pays  Only co-pays go towards the out of pocket  Once taht is met then the co-pays are waived  pcp co-pay is $20  Specialist co-pay is $40 ER co-pay is $100 waived if admitted  Advanced & standard dx imaging & labs & in pt hospitalizations & out pt surgery is call the same covered 100%  Genetic & genomic testing is covered under the labs  rx benefits thru [facility] & special[facility] pharmacy is [facility] its preferred  Can buy & bill  For labs can go to any in network lab   Called pt to go over the benefits & she thanked me for the information

## 2019-10-22 ENCOUNTER — TELEPHONE (OUTPATIENT)
Dept: SURGICAL ONCOLOGY | Facility: CLINIC | Age: 64
End: 2019-10-22

## 2019-10-22 NOTE — TELEPHONE ENCOUNTER
Breast Cancer Nurse Navigator     Called patient for initial navigation outreach  Explained reason for call  Discussed the role of breast cancer nurse navigator as well as cancer support services available to utilize as needed  Patient denies any cancer support needs at this time  She is aware of upcoming consult and reason for DcisionRT to see if she would benefit from radiation  Patient with no questions, concerns or needs at this time  She is awaiting consult with medication oncology scheduled on 10/23/19 with Dr Courtney Lepe, patient aware of appointment date/time and location  Provided patient with contact information and availability, encouraged to call as needed with any questions, concerns or needs  I will continue to follow up with patient as needed

## 2019-10-23 ENCOUNTER — DOCUMENTATION (OUTPATIENT)
Dept: HEMATOLOGY ONCOLOGY | Facility: CLINIC | Age: 64
End: 2019-10-23

## 2019-10-23 ENCOUNTER — CONSULT (OUTPATIENT)
Dept: HEMATOLOGY ONCOLOGY | Facility: CLINIC | Age: 64
End: 2019-10-23
Payer: COMMERCIAL

## 2019-10-23 ENCOUNTER — LAB REQUISITION (OUTPATIENT)
Dept: LAB | Facility: HOSPITAL | Age: 64
End: 2019-10-23

## 2019-10-23 VITALS
TEMPERATURE: 98.2 F | SYSTOLIC BLOOD PRESSURE: 128 MMHG | HEIGHT: 66 IN | BODY MASS INDEX: 27.8 KG/M2 | WEIGHT: 173 LBS | RESPIRATION RATE: 16 BRPM | DIASTOLIC BLOOD PRESSURE: 84 MMHG | OXYGEN SATURATION: 99 % | HEART RATE: 76 BPM

## 2019-10-23 DIAGNOSIS — R92.8 OTHER ABNORMAL AND INCONCLUSIVE FINDINGS ON DIAGNOSTIC IMAGING OF BREAST: ICD-10-CM

## 2019-10-23 DIAGNOSIS — D05.12 INTRADUCTAL CARCINOMA IN SITU OF LEFT BREAST: ICD-10-CM

## 2019-10-23 DIAGNOSIS — D05.12 DUCTAL CARCINOMA IN SITU (DCIS) OF LEFT BREAST: ICD-10-CM

## 2019-10-23 DIAGNOSIS — D05.12 DUCTAL CARCINOMA IN SITU (DCIS) OF LEFT BREAST: Primary | ICD-10-CM

## 2019-10-23 LAB — SCAN RESULT: NORMAL

## 2019-10-23 PROCEDURE — 99204 OFFICE O/P NEW MOD 45 MIN: CPT | Performed by: INTERNAL MEDICINE

## 2019-10-23 RX ORDER — ANASTROZOLE 1 MG/1
1 TABLET ORAL DAILY
Qty: 30 TABLET | Refills: 1 | Status: SHIPPED | OUTPATIENT
Start: 2019-10-23 | End: 2019-11-14 | Stop reason: SDUPTHER

## 2019-10-23 NOTE — PROGRESS NOTES
Completed nursing teach on Armidex with patient  Reviewed medications mechanism of action, how to store the medication, possible side effects and symptom management, when to call the office versus when to go to the ER, etc  All questions answered  Consents signed and scanned into patient chart  1 month supply of Arimidex sent to patient's [facility] pharmacy  Patient will call to update how she tolerates it and if proceed with future refills she will call and request a 90 day supply be sent to [facility]

## 2019-10-23 NOTE — PROGRESS NOTES
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   There is no family history of breast and ovarian cancer  History of arthritis     Lactose intolerance and other food allergies and diarrhea secondary to foods  Patient states she had colonoscopy and that was negative and will be going for another colonoscopy  Swelling of the ankles when humidity is high and she has been on her feet for a long time  Patient had JOSE JUAN and BSO in 2010 for nonmalignant condition  Patient's father and aunt had lung cancer in their [de-identified] and both were smokers  Menarche at age 7 or 16  Menopause in 2010 when she had hysterectomy  History of hypertension  History of Gilbert's syndrome  ROS:  10/23/19 Reviewed 13 systems:  Presently no headaches, seizures, dizziness, diplopia, dysphagia, hoarseness, chest pain, palpitations, shortness of breath, cough, hemoptysis, abdominal pain, nausea, vomiting,  melena, hematuria, fever, chills, bleeding, bone pains, skin rash, weight loss,   tiredness , weakness, numbness, claudication and gait problem  No frequent infections  Not unusually sensitive to heat or cold  No swelling of the ankles  No swollen glands     No GYN symptoms  Patient is not anxious   Other symptoms are in HPI        Historical Information   Past Medical History:   Diagnosis Date    Anxiety     "time to time"    Arthritis     Dental crowns present     Eczema     Environmental and seasonal allergies     Food allergy     Hypertension     Irritable bowel syndrome     Lactose intolerance     Motion sickness     Osteoarthritis     mainly hands/feet/right hip    Osteopenia     Vaginal Pap smear 10/06/2017    WNL    Wears contact lenses     Wears glasses      Past Surgical History:   Procedure Laterality Date    BREAST BIOPSY Left     marker implanted  x2    BREAST BIOPSY      BREAST LUMPECTOMY Left 9/27/2019    Procedure: LUMPECTOMY BREAST NEEDLE LOCALIZED; 1200 NEEDLE LOC;  Surgeon: Lukas Perez MD;  [facility];  Service: Surgical Oncology    COLONOSCOPY      CYSTOSCOPY N/A 7/1/2019    Procedure: Lavinia Flores;  Surgeon: Lesia Jeans, MD;  [facility];  Service: UroGynecology           ENDOMETRIAL ABLATION      HAND SURGERY Left     cyst removed from palm    LIVER BIOPSY      x2 early 19's    MAMMO NEEDLE LOCALIZATION LEFT (ALL INC) Left 9/27/2019    MRI BREAST BIOPSY LEFT (ALL INCLUSIVE) Left 8/30/2019    MRI BREAST BIOPSY RIGHT (ALL INCLUSIVE) Right 8/30/2019    NM POST COLPORRHAPHY,RECTUM/VAGINA N/A 7/1/2019    Procedure: POSTERIOR COLPORRHAPHY;  Surgeon: Lesia Jeans, MD;  [facility];  Service: UroGynecology           PUBOVAGINAL SLING N/A 7/1/2019    Procedure: SINGLE INCISION SLING;  Surgeon: Lesia Jeans, MD;  [facility];  Service: UroGynecology           VAGINAL HYSTERECTOMY  01/01/2010    BSO AND REPAIR   UTEROVAGINAL PROLAPSE    WISDOM TOOTH EXTRACTION       Social History   Social History     Substance and Sexual Activity   Alcohol Use Yes    Alcohol/week: 1 0 standard drinks    Types: 1 Standard drinks or equivalent per week    Frequency: Monthly or less    Drinks per session: 1 or 2    Comment: "very infrequent"     Social History     Substance and Sexual Activity   Drug Use No     Social History     Tobacco Use   Smoking Status Never Smoker   Smokeless Tobacco Never Used     Family History:   Family History   Problem Relation Age of Onset    Lung cancer Family     Diabetes Family  Lung cancer Maternal Aunt     Lung cancer Father     No Known Problems Mother     No Known Problems Daughter     No Known Problems Maternal Grandmother     No Known Problems Maternal Grandfather     No Known Problems Paternal Grandmother     No Known Problems Paternal Grandfather     No Known Problems Daughter          Current Outpatient Medications:     Cholecalciferol (VITAMIN D3) 5000 units CAPS, Take by mouth as needed , Disp: , Rfl:     fexofenadine (ALLEGRA) 180 MG tablet, daily as needed , Disp: , Rfl:     hydrocortisone 0 5 % cream, Apply topically 2 (two) times a day as needed, Disp: , Rfl:     lisinopril (ZESTRIL) 10 mg tablet, 10 mg daily , Disp: , Rfl:     metoprolol tartrate (LOPRESSOR) 50 mg tablet, daily , Disp: , Rfl:     traMADol (ULTRAM) 50 mg tablet, Take 1 tablet (50 mg total) by mouth every 8 (eight) hours as needed for moderate pain (Patient not taking: Reported on 10/15/2019), Disp: 9 tablet, Rfl: 0    Allergies   Allergen Reactions    Meperidine Nausea Only, Anxiety and Palpitations    Pineapple Tongue Swelling    Shellfish-Derived Products Throat Swelling     Happens sometimes with combination of seafood    Strawberry C [Ascorbate]      Strawberries sometimes tongue swells    Lactose GI Intolerance     intolerance    Pollen Extract Itching and Nasal Congestion     Receives allergy shots also allergy to dust mites/insects    Tree Extract Allergic Rhinitis     @ ROS@  Physical Exam:  Vitals:    10/23/19 1119   BP: 128/84   BP Location: Left arm   Patient Position: Sitting   Cuff Size: Adult   Pulse: 76   Resp: 16   Temp: 98 2 °F (36 8 °C)   SpO2: 99%   Weight: 78 5 kg (173 lb)   Height: 5' 6" (1 676 m)     Alert, oriented, not in distress, no icterus, no oral thrush, no palpable neck mass, clear lung fields, regular heart rate, abdomen  soft and non tender, no palpable abdominal mass, no ascites, no edema of ankles, no calf tenderness, no focal neurological deficit, no skin rash, no palpable lymphadenopathy in the neck and axillary areas, good arterial pulses, no clubbing  Patient is not anxious  Performance status 0  Breasts were examined by Dr Kaden Howard      Lab Results: I have reviewed all pertinent labs  LABS:  Results for orders placed or performed in visit on 10/23/19   Ancillary Pathology Sendout (AP only)   Result Value Ref Range    Scan Result DCISION RT RESULTS          Imaging Studies: I have personally reviewed pertinent reports  Pathology, and Other Studies: I have personally reviewed pertinent reports  Priority Source   Final result (10/2/2019  4:26 PM) Routine Breast, Left   Tissue Exam: [id number]   Order: [id number]   Collected:  9/27/2019  2:05 PM   Status:  Final result   Visible to patient:  Yes (MyChart)   Dx:  Ductal carcinoma in situ (DCIS) of le    Component    Case Report   Surgical Pathology Report                         Case: [id number]                                    Authorizing Provider: Aileen Garnica MD           Collected:           09/27/2019 1405               Ordering Location:     [facility]        Received:            09/27/2019 [address] [place] [address] Operating Room                                                      Pathologist:           [address], MD                                                         Specimens:   A) - Breast, Left, left breat lumpectomy short stitch superior long  stitch lateral                  B) - Breast, Left, new margin inferior                                                               C) - Breast, Left, new margin lateral                                                                D) - Breast, Left, new margin posterior                                                    Final Diagnosis   A  Left breast, lumpectomy short stitch superior long  stitch lateral:  - Small focus of ductal carcinoma in situ, low to intermediate grade, solid type, 1 mm  Slide A28   - Fibrocystic changes including florid ductal hyperplasia of the usual type, sclerosing adenosis  - Foci compatible with pseudoangiomatous stromal hyperplasia  - Portion of skin is unremarkable  - Prior biopsy site changes  - Microcalcifications are noted within benign ducts  - See synoptic report       B  Left breast, new margin inferior:  - Benign breast tissue with florid ductal hyperplasia of the usual type, sclerosing adenosis and focal pseudoangiomatous stromal hyperplasia  - Microcalcifications are noted within benign ducts       C  Left breast, new margin lateral:  - Benign breast tissue    - Microcalcifications are noted within benign ducts       D  Left breast, new margin posterior :  - Benign breast tissue              Tissue Exam: [id number]   Order: [id number]   Collected:  8/30/2019  9:55 AM Status:  Edited Result - FINAL   Visible to patient:  Yes (MyChart) Dx:  Abnormal MRI, breast   Component    Case Report   Surgical Pathology Report                         Case: [id number]                                    Authorizing Provider: Zamzam Gonsalez MD           Collected:           08/30/2019 0950               Ordering Location:     [facility]      Received:            08/30/2019 [address] [place] [address] MRI                                                                  Pathologist:           [address], MD                                                         Specimens:   A) - Breast, Left                                                                                    B) - Breast, Right                                                                         Addendum 2   At the request of Dr Duane Hays, unstained slides from paraffin block A4 containing the patient's cancer cells was sent to [facility] for DCISION RT testing utilizing the Prelude analysis   Upon completion of testing, Prelude Laboratory report will be directly sent to the requesting physician as well as posted in the Media Tab of the patient's Epic EMR by the [facility]ology Department      Please note: The Prelude Lab's analysis and report is performed independently of [facility], and neither the Hospital nor the Pathology Department screen, review or comment upon Prelude Lab's report  Because the role of testing in cancer diagnosis and management is subject to evolving development, usage and interpretation, we strongly urge that the test limitations described in the Prelude Lab report be carefully read, appropriately shared with the patient, and critically considered when reaching treatment decisions      This pathology material was removed from archive for purpose of molecular testing  It was reviewed and approved by Dr John Miranda   Addendum electronically signed by Aleksandr Lynn MD on 10/23/2019 at  9:55 AM   Addendum   Test Description                        Result               Prognostic Interpretation     Estrogen Receptor                      100%                 positive  Internal control:positive               Staining Intensity:Strong  External control:positive              Hilary Score*: 8     Progesterone Receptor               10-15%              positive  Internal control:positive               Staining Intensity: Strong  External control: positive                          Hilary Score*: 5     Appropriate positive and negative controls were reviewed      These immunohistochemical tests were performed at [facility] in [facility] and interpreted by Dr Sabra Nunez  An electronic copy of this report will be kept on file in the Medical Records Department at [facility]      * The Hilary score is a semi quantitative system that takes into consideration the proportion of positive cells (scored on a scale of 0-5) and staining intensity (scored on a scale of (0-3)   The proportion and intensity are summed to produce total scores of 0 or 2 through 8  A score of 0-2 is regarded as negative while 3-8 as positive       Addendum electronically signed by Bryon Lowe MD on 9/10/2019 at  3:36 PM   Final Diagnosis   A  Left breast, MRI guided stereotactic biopsy:              Electronically signed by Bryon Lowe MD on 10/2/2019 at  4:26 PM   Additional Information                Assessment and Plan:  DCIS left breast, 3 mm, grade 1, positive hormone receptors, status post lumpectomy with clear margins  Patient participated in DCIS prelude trial and score came back very low 0 9  I discussed with Dr Duane Hays and Dr Duane Hays mentioned that patient would not need radiation based on this result  I passed that information to the patient and gave her copy of result of   trial   We discussed patient charateristics  We discussed cancer characteristics  We discussed aromatase inhibitors in detail, side effects and benefits  Patient signed informed consent for aromatase inhibitors and she will be started on Arimidex 1 mg daily for a total of 5 years  Patient is already taking vitamin D3 and takes lactose-free dairy products  Patient is staying active  She states she had DEXA scan last year and our office will call for the results from [place] [id number]  All discussed in detail  Questions answered  Discussed the importance of self-breast examination, eating healthy foods, staying active and health screening tests  Patient is capable of self-care  She will come back in 2 months with blood counts and chemistry  1  Ductal carcinoma in situ (DCIS) of left breast    - Ambulatory referral to Hematology / Oncology  - CBC and differential; Future  - Comprehensive metabolic panel; Future    Patient voiced understanding and agreement in the discussion  Counseling / Coordination of Care    Greater than 50% of total time was spent with the patient and / or family counseling and / or coordination of care

## 2019-10-23 NOTE — TELEPHONE ENCOUNTER
1 month supply Arimidex to [facility], if patient tolerates may send new RX to [address] [place] [address] S for 90 Day Supply  Patient will call and let us know around 2 weeks of taking medication  no

## 2019-10-30 ENCOUNTER — OFFICE VISIT (OUTPATIENT)
Dept: GYNECOLOGY | Facility: CLINIC | Age: 64
End: 2019-10-30
Payer: COMMERCIAL

## 2019-10-30 VITALS
HEIGHT: 66 IN | DIASTOLIC BLOOD PRESSURE: 82 MMHG | RESPIRATION RATE: 16 BRPM | SYSTOLIC BLOOD PRESSURE: 140 MMHG | HEART RATE: 67 BPM | WEIGHT: 173.6 LBS | BODY MASS INDEX: 27.9 KG/M2

## 2019-10-30 DIAGNOSIS — Z01.419 ENCOUNTER FOR GYNECOLOGICAL EXAMINATION (GENERAL) (ROUTINE) WITHOUT ABNORMAL FINDINGS: Primary | ICD-10-CM

## 2019-10-30 DIAGNOSIS — D05.12 DUCTAL CARCINOMA IN SITU (DCIS) OF LEFT BREAST: ICD-10-CM

## 2019-10-30 PROCEDURE — 99396 PREV VISIT EST AGE 40-64: CPT | Performed by: OBSTETRICS & GYNECOLOGY

## 2019-10-30 NOTE — PROGRESS NOTES
Assessment/Plan:       Diagnoses and all orders for this visit:    Encounter for gynecological examination (general) (routine) without abnormal findings    Ductal carcinoma in situ (DCIS) of left breast          Subjective:      Patient ID: Karine Winston is a 59 y o  female  The patient is a 59-year-old who presents for an annual gyn exam   The patient was diagnosed with ductal carcinoma in situ of the left breast last month period she had been on estradiol 1 mg daily and stopped as soon a she was diagnosed  She has experienced some mild hot flashes at night, but nothing that is truly disturbing to her  She is being followed by Dr Pako Us  The patient had a vaginal sling surgery the month prior to her breast cancer diagnosis  She is doing well with this and has no complaints  She no longer has any incontinence  The patient denies any breast or bladder problems  She has some vaginal dryness but has not had intercourse yet  She will use a lubricant  The patient had a normal Pap in HPV last year and therefore these will not be repeated this year  Her mammograms are being ordered by Dr Calree Razo  The following portions of the patient's history were reviewed and updated as appropriate: allergies, current medications, past family history, past medical history, past social history, past surgical history and problem list     Review of Systems   Constitutional: Negative  Respiratory: Negative for shortness of breath  Cardiovascular: Negative for chest pain  Gastrointestinal: Negative  Genitourinary: Negative  Skin: Negative  Psychiatric/Behavioral: Negative for agitation, behavioral problems and confusion  Objective:      /82 (BP Location: Left arm, Patient Position: Sitting, Cuff Size: Standard)   Pulse 67   Resp 16   Ht 5' 6" (1 676 m)   Wt 78 7 kg (173 lb 9 6 oz)   BMI 28 02 kg/m²          Physical Exam   Constitutional: She appears well-developed and well-nourished   No distress  HENT:   Head: Normocephalic  Pulmonary/Chest: Effort normal  No respiratory distress  Right breast exhibits no inverted nipple, no mass, no nipple discharge, no skin change and no tenderness  Left breast exhibits no inverted nipple, no mass, no nipple discharge, no skin change and no tenderness  Breasts are symmetrical    Abdominal: She exhibits no distension and no mass  There is no tenderness  There is no rebound and no guarding  Genitourinary: Rectum normal  No labial fusion  There is no rash, tenderness, lesion or injury on the right labia  There is no rash, tenderness, lesion or injury on the left labia  No erythema, tenderness or bleeding in the vagina  No foreign body in the vagina  No signs of injury around the vagina  No vaginal discharge found  Genitourinary Comments: The urethral meatus is normal    Atrophic vaginal changes are present  The cervix, uterus and adnexa are surgically absent  Lymphadenopathy:        Right axillary: No pectoral and no lateral adenopathy present  Left axillary: No pectoral and no lateral adenopathy present  Skin: Skin is warm, dry and intact  She is not diaphoretic  No cyanosis  Nails show no clubbing  Psychiatric: She has a normal mood and affect   Her speech is normal and behavior is normal

## 2019-11-14 ENCOUNTER — TELEPHONE (OUTPATIENT)
Dept: HEMATOLOGY ONCOLOGY | Facility: CLINIC | Age: 64
End: 2019-11-14

## 2019-11-14 DIAGNOSIS — D05.12 DUCTAL CARCINOMA IN SITU (DCIS) OF LEFT BREAST: ICD-10-CM

## 2019-11-14 RX ORDER — ANASTROZOLE 1 MG/1
1 TABLET ORAL DAILY
Qty: 90 TABLET | Refills: 3 | Status: SHIPPED | OUTPATIENT
Start: 2019-11-14 | End: 2020-07-28 | Stop reason: SDUPTHER

## 2019-11-14 NOTE — TELEPHONE ENCOUNTER
Wanted to give you an update on the anastrazole  Side effects- gets really hot at night (hot flashes) Her arthiritis hurts a little more than usual  She is a little more tired than usual     She mentioned that they are not side effects that are difficult for her to handle  She likes the medication and would like to continue  She wants the anastrozole through [id number] [place]  She wants a 3 month supply because its less expensive than a 1 month supply

## 2019-11-14 NOTE — TELEPHONE ENCOUNTER
Called and informed Cristo Walker that her script was sent over to the verified pharmacy  Patient was very appreciative

## 2019-11-22 ENCOUNTER — TELEPHONE (OUTPATIENT)
Dept: SURGICAL ONCOLOGY | Facility: CLINIC | Age: 64
End: 2019-11-22

## 2019-11-22 NOTE — TELEPHONE ENCOUNTER
----- Message from Suzanne Mojica MD sent at 11/22/2019  2:44 PM EST -----  dcisionRT is back and low risk; no need for radiation, can let her know and mail copy to her; thanks

## 2019-12-23 ENCOUNTER — OFFICE VISIT (OUTPATIENT)
Dept: HEMATOLOGY ONCOLOGY | Facility: CLINIC | Age: 64
End: 2019-12-23
Payer: COMMERCIAL

## 2019-12-23 VITALS
WEIGHT: 175 LBS | HEART RATE: 84 BPM | OXYGEN SATURATION: 98 % | HEIGHT: 66 IN | DIASTOLIC BLOOD PRESSURE: 84 MMHG | SYSTOLIC BLOOD PRESSURE: 146 MMHG | RESPIRATION RATE: 16 BRPM | TEMPERATURE: 97.7 F | BODY MASS INDEX: 28.12 KG/M2

## 2019-12-23 DIAGNOSIS — D05.12 DUCTAL CARCINOMA IN SITU (DCIS) OF LEFT BREAST: Primary | ICD-10-CM

## 2019-12-23 PROCEDURE — 99214 OFFICE O/P EST MOD 30 MIN: CPT | Performed by: INTERNAL MEDICINE

## 2019-12-23 NOTE — PROGRESS NOTES
HPI:  Follow-up visit for hormone receptor positive, grade 1, 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 and patient had lumpectomy with clear margins on 09/27/2019  Patient participated in DCIS prelude trial and score came back very low 0 9  Slime Gilbert She did not receive radiation  based on the test result  Since October 2019 she has been on Arimidex 1 mg daily and she takes vitamin-D and calcium and stays active  She has history of osteopenia  Patient has some tiredness and tolerable hot flashes   Patient takes lactose-free dairy products       Current Outpatient Medications:     anastrozole (ARIMIDEX) 1 mg tablet, Take 1 tablet (1 mg total) by mouth daily, Disp: 90 tablet, Rfl: 3    Cholecalciferol (VITAMIN D3) 5000 units CAPS, Take by mouth as needed , Disp: , Rfl:     fexofenadine (ALLEGRA) 180 MG tablet, daily as needed , Disp: , Rfl:     hydrocortisone 0 5 % cream, Apply topically 2 (two) times a day as needed, Disp: , Rfl:     lisinopril (ZESTRIL) 10 mg tablet, 10 mg daily , Disp: , Rfl:     metoprolol tartrate (LOPRESSOR) 50 mg tablet, daily , Disp: , Rfl:     traMADol (ULTRAM) 50 mg tablet, Take 1 tablet (50 mg total) by mouth every 8 (eight) hours as needed for moderate pain (Patient not taking: Reported on 10/15/2019), Disp: 9 tablet, Rfl: 0    Allergies   Allergen Reactions    Meperidine Nausea Only, Anxiety and Palpitations    Pineapple Tongue Swelling    Shellfish-Derived Products Throat Swelling     Happens sometimes with combination of seafood    Strawberry C [Ascorbate]      Strawberries sometimes tongue swells    Lactose GI Intolerance     intolerance    Pollen Extract Itching and Nasal Congestion     Receives allergy shots also allergy to dust mites/insects    Tree Extract Allergic Rhinitis          Ductal carcinoma in situ (DCIS) of left breast    9/9/2019 Initial Diagnosis     Ductal carcinoma in situ (DCIS) of left breast      9/9/2019 -  Cancer Staged     Staging form: Breast, AJCC 8th Edition  - Clinical: Stage 0 (cTis (DCIS), cN0, cM0, G1, ER: Unknown, FL: Unknown) - Signed by Nicky Estrada MD on 9/9/2019  Laterality: Left  Method of lymph node assessment: Clinical  Histologic grading system: 3 grade system        10/15/2019 -  Cancer Staged     Staging form: Breast, AJCC 8th Edition  - Pathologic: Stage Unknown (pTis (DCIS), pNX, cM0, G2, ER+, FL+) - Signed by Nicky Estrada MD on 10/15/2019  Laterality: Left  Histologic grading system: 3 grade system           ROS:  12/23/19 Reviewed 13 systems:  Presently no neurological, cardiac, pulmonary, GI and  symptoms  No other symptoms like fever, chills, bleeding, bone pains, skin rash, weight loss, arthritic symptoms,   weakness, numbness,  claudication and gait problem  No frequent infections  Not unusually sensitive to cold  No swelling of the ankles  No swollen glands  Patient is anxious  Other symptoms are in HPI        /84 (BP Location: Right arm, Patient Position: Sitting, Cuff Size: Adult)   Pulse 84   Temp 97 7 °F (36 5 °C)   Resp 16   Ht 5' 6" (1 676 m)   Wt 79 4 kg (175 lb)   SpO2 98%   BMI 28 25 kg/m²     Physical Exam:  Alert, oriented, not in distress, no icterus, no oral thrush, no palpable neck mass, clear lung fields, regular heart rate, abdomen  soft and non tender, no palpable abdominal mass, no ascites, no edema of ankles, no calf tenderness, no focal neurological deficit, no skin rash, no palpable lymphadenopathy in the neck and axillary areas, good arterial pulses, no clubbing  Patient is anxious  Performance status 0  No lymphedema      IMAGING:      LABS:  Results for orders placed or performed in visit on 10/23/19   Ancillary Pathology Sendout (AP only)   Result Value Ref Range    Scan Result DCISION RT RESULTS      Labs, Imaging, & Other studies:   All pertinent labs and imaging studies were personally reviewed    Lab Results   Component Value Date    K 3 9 09/16/2019     09/16/2019    CO2 27 09/16/2019 BUN 8 09/16/2019    CREATININE 0 70 09/16/2019    GLUF 93 09/16/2019    CALCIUM 9 3 09/16/2019    AST 16 09/16/2019    ALT 21 09/16/2019    ALKPHOS 48 09/16/2019    EGFR 93 09/16/2019     Lab Results   Component Value Date    WBC 7 41 09/16/2019    HGB 13 6 09/16/2019    HCT 41 8 09/16/2019    MCV 96 09/16/2019     09/16/2019   On 12/12/2019 she had blood tests at [facility] and has normal blood counts and chemistry  Reviewed and discussed with patient  Assessment and plan: Follow-up visit for hormone receptor positive, grade 1, 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 and patient had lumpectomy with clear margins on 09/27/2019  Patient participated in DCIS prelude trial and score came back very low 0 9  Rafaela Ho She did not receive radiation  based on the test result  Since October 2019 she has been on Arimidex 1 mg daily and she takes vitamin-D and calcium and stays active  She has history of osteopenia  Patient has some tiredness and tolerable hot flashes   Patient takes lactose-free dairy products     Physical examination and test results are as recorded and discussed  No change in therapy  Condition discussed and explained  Questions answered  Discussed the importance of self-breast examination, eating healthy foods, staying active and health screening tests  Patient is capable of self-care  She will come back in 3 months with blood counts and chemistry  Patient follows with her breast surgeon for examination and imaging studies  1  Ductal carcinoma in situ (DCIS) of left breast    - CBC and differential; Future  - Comprehensive metabolic panel; Future  2  Osteopenia  3  Lactose intolerance  4  Essential hypertension      Patient voiced understanding and agreement in the discussion  Counseling / Coordination of Care   Greater than 50% of total time was spent with the patient and / or family counseling and / or coordination of care

## 2020-01-30 ENCOUNTER — TELEPHONE (OUTPATIENT)
Dept: HEMATOLOGY ONCOLOGY | Facility: CLINIC | Age: 65
End: 2020-01-30

## 2020-01-30 NOTE — TELEPHONE ENCOUNTER
Spoke to patient and informed her that her appt on 4/3/20 needs to be R/S due to Dr Feli Goldberg no longer seeing patients on Friday at the [facility] office  Her new appt is 4/7/20 at 3:20 pm at the [facility] location with Dr Feli Goldberg  Patient was fine with the appt change

## 2020-02-10 ENCOUNTER — OFFICE VISIT (OUTPATIENT)
Dept: SURGICAL ONCOLOGY | Facility: CLINIC | Age: 65
End: 2020-02-10
Payer: COMMERCIAL

## 2020-02-10 VITALS
WEIGHT: 175 LBS | TEMPERATURE: 99.2 F | HEART RATE: 90 BPM | BODY MASS INDEX: 28.12 KG/M2 | RESPIRATION RATE: 16 BRPM | DIASTOLIC BLOOD PRESSURE: 90 MMHG | HEIGHT: 66 IN | SYSTOLIC BLOOD PRESSURE: 140 MMHG

## 2020-02-10 DIAGNOSIS — D05.12 DUCTAL CARCINOMA IN SITU (DCIS) OF LEFT BREAST: Primary | ICD-10-CM

## 2020-02-10 DIAGNOSIS — R92.2 DENSE BREAST TISSUE: ICD-10-CM

## 2020-02-10 DIAGNOSIS — Z79.811 USE OF ANASTROZOLE: ICD-10-CM

## 2020-02-10 PROBLEM — N60.11 FIBROCYSTIC BREAST CHANGES, RIGHT: Status: RESOLVED | Noted: 2019-09-09 | Resolved: 2020-02-10

## 2020-02-10 PROBLEM — R92.8 ABNORMAL MAMMOGRAM OF BOTH BREASTS: Status: RESOLVED | Noted: 2019-08-06 | Resolved: 2020-02-10

## 2020-02-10 PROBLEM — R92.8 ABNORMAL MRI, BREAST: Status: RESOLVED | Noted: 2019-08-06 | Resolved: 2020-02-10

## 2020-02-10 PROCEDURE — 99214 OFFICE O/P EST MOD 30 MIN: CPT | Performed by: SURGERY

## 2020-02-10 NOTE — PROGRESS NOTES
Surgical Oncology Follow Up       [address] [facility] [address]    Fermin Cerda  1955  [id number]  [facility] [address]    Chief Complaint   Patient presents with    Breast Cancer     Pt is here for 6 month f/u        Assessment/Plan   Diagnoses and all orders for this visit:    Ductal carcinoma in situ (DCIS) of left breast  -     Mammo diagnostic bilateral w 3d & cad; Future    Dense breast tissue    Use of anastrozole        Advance Care Planning/Advance Directives:  Discussed disease status, cancer treatment plans and/or cancer treatment goals with the patient  Oncology History:       Ductal carcinoma in situ (DCIS) of left breast    9/9/2019 Initial Diagnosis     Ductal carcinoma in situ (DCIS) of left breast      9/9/2019 -  Cancer Staged     Staging form: Breast, AJCC 8th Edition  - Clinical: Stage 0 (cTis (DCIS), cN0, cM0, G1, ER: Unknown, NH: Unknown) - Signed by Ganesh Ramon MD on 9/9/2019  Laterality: Left  Method of lymph node assessment: Clinical  Histologic grading system: 3 grade system        10/15/2019 -  Cancer Staged     Staging form: Breast, AJCC 8th Edition  - Pathologic: Stage Unknown (pTis (DCIS), pNX, cM0, G2, ER+, NH+) - Signed by Ganesh Ramon MD on 10/15/2019  Laterality: Left  Histologic grading system: 3 grade system           History of Present Illness:  Breast cancer follow-up, no concerns  -Interval History:  Started anastrozole    Review of Systems:  Review of Systems   Constitutional: Negative for appetite change and fever  Hot flashes   Eyes: Negative  Respiratory: Negative for shortness of breath  Cardiovascular: Negative  Gastrointestinal: Negative  Endocrine: Negative  Genitourinary: Negative  Musculoskeletal: Positive for arthralgias  Negative for myalgias     Skin: Negative  Allergic/Immunologic: Negative  Neurological: Negative  Hematological: Negative  Negative for adenopathy  Does not bruise/bleed easily  Psychiatric/Behavioral: Negative          Patient Active Problem List   Diagnosis    Dense breast tissue    Ductal carcinoma in situ (DCIS) of left breast    Use of anastrozole     Past Medical History:   Diagnosis Date    Anxiety     "time to time"    Arthritis     Dental crowns present     Eczema     Environmental and seasonal allergies     Food allergy     Hypertension     Irritable bowel syndrome     Lactose intolerance     Motion sickness     Osteoarthritis     mainly hands/feet/right hip    Osteopenia     Vaginal Pap smear 10/06/2017    WNL    Wears contact lenses     Wears glasses      Past Surgical History:   Procedure Laterality Date    BREAST BIOPSY Left     marker implanted  x2    BREAST BIOPSY      BREAST LUMPECTOMY Left 9/27/2019    Procedure: LUMPECTOMY BREAST NEEDLE LOCALIZED; 1200 NEEDLE LOC;  Surgeon: Niko Palafox MD;  [facility];  Service: Surgical Oncology    COLONOSCOPY      CYSTOSCOPY N/A 7/1/2019    Procedure: CYSTOSCOPY;  Surgeon: Reji Banegas MD;  [facility];  Service: UroGynecology           ENDOMETRIAL ABLATION      HAND SURGERY Left     cyst removed from palm    LIVER BIOPSY      x2 early 19's    MAMMO NEEDLE LOCALIZATION LEFT (ALL INC) Left 9/27/2019    MRI BREAST BIOPSY LEFT (ALL INCLUSIVE) Left 8/30/2019    MRI BREAST BIOPSY RIGHT (ALL INCLUSIVE) Right 8/30/2019    LA POST COLPORRHAPHY,RECTUM/VAGINA N/A 7/1/2019    Procedure: POSTERIOR COLPORRHAPHY;  Surgeon: Reji Banegas MD;  [facility];  Service: UroGynecology           PUBOVAGINAL SLING N/A 7/1/2019    Procedure: SINGLE INCISION SLING;  Surgeon: Reji Banegas MD;  [facility];  Service: UroGynecology           VAGINAL HYSTERECTOMY  01/01/2010    BSO AND REPAIR   UTEROVAGINAL PROLAPSE    JAZMYNE TOOTH EXTRACTION       Family History   Problem Relation Age of Onset    Lung cancer Family     Diabetes Family     Lung cancer Maternal Aunt     Lung cancer Father     No Known Problems Mother     No Known Problems Daughter     No Known Problems Maternal Grandmother     No Known Problems Maternal Grandfather     No Known Problems Paternal Grandmother     No Known Problems Paternal Grandfather     No Known Problems Daughter      Social History     Socioeconomic History    Marital status: /Civil Union     Spouse name: Not on file    Number of children: Not on file    Years of education: Not on file    Highest education level: Not on file   Occupational History    Not on file   Social Needs    Financial resource strain: Not on file    Food insecurity:     Worry: Not on file     Inability: Not on file    Transportation needs:     Medical: Not on file     Non-medical: Not on file   Tobacco Use    Smoking status: Never Smoker    Smokeless tobacco: Never Used   Substance and Sexual Activity    Alcohol use:  Yes     Alcohol/week: 1 0 standard drinks     Types: 1 Standard drinks or equivalent per week     Frequency: Monthly or less     Drinks per session: 1 or 2     Comment: "very infrequent"    Drug use: No    Sexual activity: Yes   Lifestyle    Physical activity:     Days per week: Not on file     Minutes per session: Not on file    Stress: Not on file   Relationships    Social connections:     Talks on phone: Not on file     Gets together: Not on file     Attends Church service: Not on file     Active member of club or organization: Not on file     Attends meetings of clubs or organizations: Not on file     Relationship status: Not on file    Intimate partner violence:     Fear of current or ex partner: Not on file     Emotionally abused: Not on file     Physically abused: Not on file     Forced sexual activity: Not on file   Other Topics Concern    Not on file   Social History Narrative    Not on file       Current Outpatient Medications:     anastrozole (ARIMIDEX) 1 mg tablet, Take 1 tablet (1 mg total) by mouth daily, Disp: 90 tablet, Rfl: 3    Cholecalciferol (VITAMIN D3) 5000 units CAPS, Take by mouth as needed , Disp: , Rfl:     fexofenadine (ALLEGRA) 180 MG tablet, daily as needed , Disp: , Rfl:     hydrocortisone 0 5 % cream, Apply topically 2 (two) times a day as needed, Disp: , Rfl:     lisinopril (ZESTRIL) 10 mg tablet, 10 mg daily , Disp: , Rfl:     metoprolol tartrate (LOPRESSOR) 50 mg tablet, daily , Disp: , Rfl:     traMADol (ULTRAM) 50 mg tablet, Take 1 tablet (50 mg total) by mouth every 8 (eight) hours as needed for moderate pain (Patient not taking: Reported on 10/15/2019), Disp: 9 tablet, Rfl: 0  Allergies   Allergen Reactions    Meperidine Nausea Only, Anxiety and Palpitations    Pineapple Tongue Swelling    Shellfish-Derived Products Throat Swelling     Happens sometimes with combination of seafood    Strawberry C [Ascorbate]      Strawberries sometimes tongue swells    Lactose GI Intolerance     intolerance    Pollen Extract Itching and Nasal Congestion     Receives allergy shots also allergy to dust mites/insects    Tree Extract Allergic Rhinitis       The following portions of the patient's history were reviewed and updated as appropriate: allergies, current medications, past family history, past medical history, past social history, past surgical history and problem list         Vitals:    02/10/20 0839   BP: 140/90   Pulse: 90   Resp: 16   Temp: 99 2 °F (37 3 °C)       Physical Exam   Constitutional: She is oriented to person, place, and time  She appears well-developed and well-nourished  HENT:   Head: Normocephalic and atraumatic  Cardiovascular: Normal heart sounds  Pulmonary/Chest: Breath sounds normal  Right breast exhibits no inverted nipple, no mass, no nipple discharge, no skin change and no tenderness   Left breast exhibits skin change (Lumpectomy scar)  Left breast exhibits no inverted nipple, no mass, no nipple discharge and no tenderness  Abdominal: Soft  Lymphadenopathy:        Right axillary: No pectoral and no lateral adenopathy present  Left axillary: No pectoral and no lateral adenopathy present  Right: No supraclavicular adenopathy present  Left: No supraclavicular adenopathy present  Neurological: She is alert and oriented to person, place, and time  Psychiatric: She has a normal mood and affect  Discussion/Summary:  61-year-old female status post left breast conservation for ductal carcinoma in situ  She had a low risk dcision RT and did not have any radiation  She started on Arimidex and reports hot flashes that have improved as well as a slight increase in her arthritis  There is no evidence of disease based on examination today  I will make arrangements for her mammogram due in May  I will see her again in six months or sooner should the need arise

## 2020-02-28 NOTE — TELEPHONE ENCOUNTER
As per Dr Gertrude Mae, pt was called with DCISion RT results and no need for RT  She verbalized understanding  Copy sent to her via mail  detailed exam

## 2020-04-27 ENCOUNTER — TELEPHONE (OUTPATIENT)
Dept: HEMATOLOGY ONCOLOGY | Facility: CLINIC | Age: 65
End: 2020-04-27

## 2020-04-28 ENCOUNTER — TELEMEDICINE (OUTPATIENT)
Dept: HEMATOLOGY ONCOLOGY | Facility: CLINIC | Age: 65
End: 2020-04-28
Payer: COMMERCIAL

## 2020-04-28 DIAGNOSIS — D05.12 DUCTAL CARCINOMA IN SITU (DCIS) OF LEFT BREAST: Primary | ICD-10-CM

## 2020-04-28 DIAGNOSIS — E80.4 GILBERT'S SYNDROME: ICD-10-CM

## 2020-04-28 DIAGNOSIS — Z79.811 USE OF ANASTROZOLE: ICD-10-CM

## 2020-04-28 PROCEDURE — 99214 OFFICE O/P EST MOD 30 MIN: CPT | Performed by: INTERNAL MEDICINE

## 2020-05-15 ENCOUNTER — HOSPITAL ENCOUNTER (OUTPATIENT)
Dept: ULTRASOUND IMAGING | Facility: CLINIC | Age: 65
Discharge: HOME | End: 2020-05-15
Payer: COMMERCIAL

## 2020-05-15 ENCOUNTER — HOSPITAL ENCOUNTER (OUTPATIENT)
Dept: MAMMOGRAPHY | Facility: CLINIC | Age: 65
Discharge: HOME | End: 2020-05-15
Payer: COMMERCIAL

## 2020-05-15 VITALS — BODY MASS INDEX: 27 KG/M2 | HEIGHT: 66 IN | WEIGHT: 168 LBS

## 2020-05-15 DIAGNOSIS — Z85.3 PERSONAL HISTORY OF MALIGNANT NEOPLASM OF BREAST: ICD-10-CM

## 2020-05-15 DIAGNOSIS — D05.12 DUCTAL CARCINOMA IN SITU (DCIS) OF LEFT BREAST: ICD-10-CM

## 2020-05-15 PROCEDURE — 76642 ULTRASOUND BREAST LIMITED: CPT

## 2020-05-15 PROCEDURE — G0279 TOMOSYNTHESIS, MAMMO: HCPCS

## 2020-05-15 PROCEDURE — 77066 DX MAMMO INCL CAD BI: CPT

## 2020-07-28 ENCOUNTER — OFFICE VISIT (OUTPATIENT)
Dept: HEMATOLOGY ONCOLOGY | Facility: CLINIC | Age: 65
End: 2020-07-28
Payer: COMMERCIAL

## 2020-07-28 VITALS
RESPIRATION RATE: 18 BRPM | HEART RATE: 86 BPM | HEIGHT: 66 IN | WEIGHT: 172 LBS | TEMPERATURE: 98.1 F | OXYGEN SATURATION: 99 % | DIASTOLIC BLOOD PRESSURE: 90 MMHG | BODY MASS INDEX: 27.64 KG/M2 | SYSTOLIC BLOOD PRESSURE: 156 MMHG

## 2020-07-28 DIAGNOSIS — Z79.811 USE OF ANASTROZOLE: ICD-10-CM

## 2020-07-28 DIAGNOSIS — D05.12 DUCTAL CARCINOMA IN SITU (DCIS) OF LEFT BREAST: Primary | ICD-10-CM

## 2020-07-28 DIAGNOSIS — T38.6X5A OSTEOPOROSIS DUE TO AROMATASE INHIBITOR: ICD-10-CM

## 2020-07-28 DIAGNOSIS — E80.4 GILBERT'S SYNDROME: ICD-10-CM

## 2020-07-28 DIAGNOSIS — M81.8 OSTEOPOROSIS DUE TO AROMATASE INHIBITOR: ICD-10-CM

## 2020-07-28 DIAGNOSIS — I10 ESSENTIAL HYPERTENSION: ICD-10-CM

## 2020-07-28 PROCEDURE — 99214 OFFICE O/P EST MOD 30 MIN: CPT | Performed by: INTERNAL MEDICINE

## 2020-07-28 RX ORDER — ANASTROZOLE 1 MG/1
1 TABLET ORAL DAILY
Qty: 90 TABLET | Refills: 3 | Status: SHIPPED | OUTPATIENT
Start: 2020-07-28 | End: 2021-07-13

## 2020-07-28 NOTE — PROGRESS NOTES
HPI:  In September 2019 patient had left breast  lumpectomy for DCIS in left breast, hormone receptor-positive, grade 1, 3 millimeter in size and clear margins  Patient did not receive radiation because on prelude clinical trial score was very low 0 9  Since October 2019 she has been on Arimidex  She has some tiredness and tolerable hot flashes  No significant musculoskeletal symptoms  She has hyperlipidemia and family physician is managing that with diet and exercise and she may need medication for it because Arimidex could raise cholesterol  She has been aware of that and she did mention that to her family physician  Her blood pressure is high and she has been taking blood pressure medication and she is keeping a log for the family physician  She has lactose  intolerance and she is on lactose-free diet  She has osteopenia  Arimidex could cause osteopenia/osteoporosis  Patient is taking calcium with vitamin-D and extra vitamin-D and is staying active           Current Outpatient Medications:     anastrozole (ARIMIDEX) 1 mg tablet, Take 1 tablet (1 mg total) by mouth daily, Disp: 90 tablet, Rfl: 3    Cholecalciferol (VITAMIN D3) 5000 units CAPS, Take by mouth as needed , Disp: , Rfl:     fexofenadine (ALLEGRA) 180 MG tablet, daily as needed , Disp: , Rfl:     hydrocortisone 0 5 % cream, Apply topically 2 (two) times a day as needed, Disp: , Rfl:     lisinopril (ZESTRIL) 10 mg tablet, 10 mg daily , Disp: , Rfl:     metoprolol tartrate (LOPRESSOR) 50 mg tablet, daily , Disp: , Rfl:     traMADol (ULTRAM) 50 mg tablet, Take 1 tablet (50 mg total) by mouth every 8 (eight) hours as needed for moderate pain (Patient not taking: Reported on 10/15/2019), Disp: 9 tablet, Rfl: 0    Allergies   Allergen Reactions    Meperidine Nausea Only, Anxiety and Palpitations    Pineapple Tongue Swelling    Shellfish-Derived Products Throat Swelling     Happens sometimes with combination of seafood    Strawberry C [Ascorbate]      Strawberries sometimes tongue swells    Lactose GI Intolerance     intolerance    Pollen Extract Itching and Nasal Congestion     Receives allergy shots also allergy to dust mites/insects    Tree Extract Allergic Rhinitis          Ductal carcinoma in situ (DCIS) of left breast    9/9/2019 Initial Diagnosis     Ductal carcinoma in situ (DCIS) of left breast      9/9/2019 -  Cancer Staged     Staging form: Breast, AJCC 8th Edition  - Clinical: Stage 0 (cTis (DCIS), cN0, cM0, G1, ER: Unknown, GA: Unknown) - Signed by Jimbo Pandya MD on 9/9/2019  Laterality: Left  Method of lymph node assessment: Clinical  Histologic grading system: 3 grade system        10/15/2019 -  Cancer Staged     Staging form: Breast, AJCC 8th Edition  - Pathologic: Stage Unknown (pTis (DCIS), pNX, cM0, G2, ER+, GA+) - Signed by Jimbo Pandya MD on 10/15/2019  Laterality: Left  Histologic grading system: 3 grade system           ROS:  07/28/20 Reviewed 13 systems: See symptoms in HPI:  Tiredness  Hot flashes  Presently no headaches, seizures, dizziness, diplopia, dysphagia, hoarseness, chest pain, palpitations, shortness of breath, cough, hemoptysis, abdominal pain, nausea, vomiting, change in bowel habits, melena, hematuria, fever, chills, bleeding, bone pains, skin rash, weight loss, arthritic symptoms,   weakness, numbness,  claudication and gait problem  No frequent infections  Not unusually sensitive to  cold  No swelling of the ankles  No swollen glands  Patient is anxious         /90 (BP Location: Left arm, Patient Position: Sitting, Cuff Size: Adult)   Pulse 86   Temp 98 1 °F (36 7 °C)   Resp 18   Ht 5' 6" (1 676 m)   Wt 78 kg (172 lb)   SpO2 99%   BMI 27 76 kg/m²     Physical Exam:     Our check out person Zulema Grande was with me in the room during examination  Alert, oriented, not in distress, no icterus, no oral thrush, no palpable neck mass, clear lung fields, regular heart rate, abdomen  soft and non tender, no palpable abdominal mass, no ascites, no edema of ankles, no calf tenderness, no focal neurological deficit, no skin rash, no palpable lymphadenopathy in the neck and axillary areas, good arterial pulses  Patient is anxious  Performance status 0     Patient goes to her breast surgeon for examination and imaging studies  IMAGING:     Negative mammography and ultrasound of left breast for malignancy    LABS:  Results for orders placed or performed in visit on 10/23/19   Ancillary Pathology Sendout ([facility] only)   Result Value Ref Range    Scan Result DCISION RT RESULTS      Labs, Imaging, & Other studies:   All pertinent labs and imaging studies were personally reviewed    Lab Results   Component Value Date    K 3 9 09/16/2019     09/16/2019    CO2 27 09/16/2019    BUN 8 09/16/2019    CREATININE 0 70 09/16/2019    GLUF 93 09/16/2019    CALCIUM 9 3 09/16/2019    AST 16 09/16/2019    ALT 21 09/16/2019    ALKPHOS 48 09/16/2019    EGFR 93 09/16/2019     Lab Results   Component Value Date    WBC 7 41 09/16/2019    HGB 13 6 09/16/2019    HCT 41 8 09/16/2019    MCV 96 09/16/2019     09/16/2019       Reviewed and discussed with patient  Assessment and plan:   In September 2019 patient had left breast  lumpectomy for DCIS in left breast, hormone receptor-positive, grade 1, 3 millimeter in size and clear margins  Patient did not receive radiation because on prelude clinical trial score was very low 0 9  Since October 2019 she has been on Arimidex  She has some tiredness and tolerable hot flashes  No significant musculoskeletal symptoms  She has hyperlipidemia and family physician is managing that with diet and exercise and she may need medication for it because Arimidex could raise cholesterol  She has been aware of that and she did mention that to her family physician  Her blood pressure is high and she has been taking blood pressure medication and she is keeping a log for the family physician  She has lactose  intolerance and she is on lactose-free diet  She has osteopenia  Arimidex could cause osteopenia/osteoporosis  Patient is taking calcium with vitamin-D and extra vitamin-D and is staying active  [facility] Physical examination and test results are as recorded and discussed  No change in therapy  Condition discussed and explained  Questions answered  She will have bone density test   See above  She has slightly high bilirubin and that is mostly indirect bilirubin and she could have Gilbert's syndrome  Discussed the importance of self-breast examination, eating healthy foods, staying active and health screening tests   Patient is capable of self-care  She will come back in 6 months with blood counts and chemistry and bone density test  Patient follows with her breast surgeon for examination and imaging studies       Discussed precautions against coronavirus  1  Ductal carcinoma in situ (DCIS) of left breast    - CBC and differential; Future  - Comprehensive metabolic panel; Future  - anastrozole (ARIMIDEX) 1 mg tablet; Take 1 tablet (1 mg total) by mouth daily  Dispense: 90 tablet; Refill: 3    2  Osteoporosis due to aromatase inhibitor    - DXA bone density spine hip and pelvis; Future    3  Use of anastrozole      4  Gilbert's syndrome      5  Essential hypertension      6  Lactose intolerance          Patient voiced understanding and agreement in the discussion  Counseling / Coordination of Care   Greater than 50% of total time was spent with the patient and / or family counseling and / or coordination of care

## 2020-08-11 ENCOUNTER — OFFICE VISIT (OUTPATIENT)
Dept: SURGICAL ONCOLOGY | Facility: CLINIC | Age: 65
End: 2020-08-11
Payer: COMMERCIAL

## 2020-08-11 VITALS
SYSTOLIC BLOOD PRESSURE: 120 MMHG | RESPIRATION RATE: 16 BRPM | DIASTOLIC BLOOD PRESSURE: 80 MMHG | BODY MASS INDEX: 27.8 KG/M2 | HEIGHT: 66 IN | WEIGHT: 173 LBS | HEART RATE: 72 BPM | TEMPERATURE: 97.5 F

## 2020-08-11 DIAGNOSIS — D05.12 DUCTAL CARCINOMA IN SITU (DCIS) OF LEFT BREAST: Primary | ICD-10-CM

## 2020-08-11 DIAGNOSIS — Z79.811 USE OF ANASTROZOLE: ICD-10-CM

## 2020-08-11 PROBLEM — R92.30 DENSE BREAST TISSUE: Status: RESOLVED | Noted: 2019-08-06 | Resolved: 2020-08-11

## 2020-08-11 PROBLEM — R92.2 DENSE BREAST TISSUE: Status: RESOLVED | Noted: 2019-08-06 | Resolved: 2020-08-11

## 2020-08-11 PROCEDURE — 99214 OFFICE O/P EST MOD 30 MIN: CPT | Performed by: SURGERY

## 2020-08-11 NOTE — PROGRESS NOTES
Surgical Oncology Follow Up       [address] [facility] SURGICAL ONCOLOGY [facility] [address]    Lucina Lopez  1955  [id number]  [facility] SURGICAL ONCOLOGY [facility] [address]    Chief Complaint   Patient presents with    Breast Cancer     Pt is here for 6 month f/u        Assessment/Plan   Diagnoses and all orders for this visit:    Ductal carcinoma in situ (DCIS) of left breast    Use of anastrozole        Advance Care Planning/Advance Directives:  Discussed disease status, cancer treatment plans and/or cancer treatment goals with the patient  Oncology History:    Oncology History   Ductal carcinoma in situ (DCIS) of left breast   9/9/2019 Initial Diagnosis    Ductal carcinoma in situ (DCIS) of left breast     9/9/2019 -  Cancer Staged    Staging form: Breast, AJCC 8th Edition  - Clinical: Stage 0 (cTis (DCIS), cN0, cM0, G1, ER: Unknown, FL: Unknown) - Signed by Leona Yanes MD on 9/9/2019  Laterality: Left  Method of lymph node assessment: Clinical  Histologic grading system: 3 grade system       10/15/2019 -  Cancer Staged    Staging form: Breast, AJCC 8th Edition  - Pathologic: Stage Unknown (pTis (DCIS), pNX, cM0, G2, ER+, FL+) - Signed by Leona Yanes MD on 10/15/2019  Laterality: Left  Histologic grading system: 3 grade system           History of Present Illness:  Breast cancer follow-up, no concerns  -Interval History:  Recent mammogram    Review of Systems:  Review of Systems   Constitutional: Negative  Negative for appetite change and fever  Eyes: Negative  Respiratory: Negative for shortness of breath  Cardiovascular: Negative  Gastrointestinal: Negative  Endocrine: Negative  Genitourinary: Negative  Musculoskeletal: Negative  Negative for arthralgias and myalgias  Skin: Negative  Allergic/Immunologic: Negative  Neurological: Negative  Hematological: Negative  Negative for adenopathy  Does not bruise/bleed easily  Psychiatric/Behavioral: Negative          Patient Active Problem List   Diagnosis    Ductal carcinoma in situ (DCIS) of left breast    Use of anastrozole    Gilbert's syndrome    Essential hypertension     Past Medical History:   Diagnosis Date    Anxiety     "time to time"    Arthritis     Dental crowns present     Eczema     Environmental and seasonal allergies     Food allergy     Hypertension     Irritable bowel syndrome     Lactose intolerance     Motion sickness     Osteoarthritis     mainly hands/feet/right hip    Osteopenia     Vaginal Pap smear 10/06/2017    WNL    Wears contact lenses     Wears glasses      Past Surgical History:   Procedure Laterality Date    BREAST BIOPSY Left     marker implanted  x2    BREAST BIOPSY Right 08/30/2019    benign    BREAST LUMPECTOMY Left 9/27/2019    Procedure: LUMPECTOMY BREAST NEEDLE LOCALIZED; 1200 NEEDLE LOC;  Surgeon: Olivia Park MD;  [facility];  Service: Surgical Oncology    COLONOSCOPY      CYSTOSCOPY N/A 7/1/2019    Procedure: CYSTOSCOPY;  Surgeon: Yair Valdez MD;  [facility];  Service: UroGynecology           ENDOMETRIAL ABLATION      HAND SURGERY Left     cyst removed from palm    LIVER BIOPSY      x2 early 19's    MAMMO NEEDLE LOCALIZATION LEFT (ALL INC) Left 9/27/2019    MRI BREAST BIOPSY LEFT (ALL INCLUSIVE) Left 8/30/2019    MRI BREAST BIOPSY RIGHT (ALL INCLUSIVE) Right 8/30/2019    DC POST COLPORRHAPHY,RECTUM/VAGINA N/A 7/1/2019    Procedure: POSTERIOR COLPORRHAPHY;  Surgeon: Yair Valdez MD;  [facility];  Service: UroGynecology           PUBOVAGINAL SLING N/A 7/1/2019    Procedure: SINGLE INCISION SLING;  Surgeon: Yair Valdez MD;  [facility];  Service: UroGynecology           VAGINAL HYSTERECTOMY  01/01/2010    BSO AND REPAIR   UTEROVAGINAL PROLAPSE    WISDOM TOOTH EXTRACTION Family History   Problem Relation Age of Onset    Lung cancer Family     Diabetes Family     Lung cancer Maternal Aunt     Lung cancer Father     No Known Problems Mother     No Known Problems Daughter     No Known Problems Maternal Grandmother     No Known Problems Maternal Grandfather     No Known Problems Paternal Grandmother     No Known Problems Paternal Grandfather     No Known Problems Daughter      Social History     Socioeconomic History    Marital status: /Civil Union     Spouse name: Not on file    Number of children: Not on file    Years of education: Not on file    Highest education level: Not on file   Occupational History    Not on file   Social Needs    Financial resource strain: Not on file    Food insecurity     Worry: Not on file     Inability: Not on file   [employer] needs     Medical: Not on file     Non-medical: Not on file   Tobacco Use    Smoking status: Never Smoker    Smokeless tobacco: Never Used   Substance and Sexual Activity    Alcohol use:  Yes     Alcohol/week: 1 0 standard drinks     Types: 1 Standard drinks or equivalent per week     Frequency: Monthly or less     Drinks per session: 1 or 2     Comment: "very infrequent"    Drug use: No    Sexual activity: Yes   Lifestyle    Physical activity     Days per week: Not on file     Minutes per session: Not on file    Stress: Not on file   Relationships    Social connections     Talks on phone: Not on file     Gets together: Not on file     Attends Restoration service: Not on file     Active member of club or organization: Not on file     Attends meetings of clubs or organizations: Not on file     Relationship status: Not on file    Intimate partner violence     Fear of current or ex partner: Not on file     Emotionally abused: Not on file     Physically abused: Not on file     Forced sexual activity: Not on file   Other Topics Concern    Not on file   Social History Narrative    Not on file Current Outpatient Medications:     anastrozole (ARIMIDEX) 1 mg tablet, Take 1 tablet (1 mg total) by mouth daily, Disp: 90 tablet, Rfl: 3    Cholecalciferol (VITAMIN D3) 5000 units CAPS, Take by mouth as needed , Disp: , Rfl:     fexofenadine (ALLEGRA) 180 MG tablet, daily as needed , Disp: , Rfl:     hydrocortisone 0 5 % cream, Apply topically 2 (two) times a day as needed, Disp: , Rfl:     lisinopril (ZESTRIL) 10 mg tablet, 10 mg daily , Disp: , Rfl:     metoprolol tartrate (LOPRESSOR) 50 mg tablet, daily , Disp: , Rfl:     traMADol (ULTRAM) 50 mg tablet, Take 1 tablet (50 mg total) by mouth every 8 (eight) hours as needed for moderate pain (Patient not taking: Reported on 10/15/2019), Disp: 9 tablet, Rfl: 0  Allergies   Allergen Reactions    Meperidine Nausea Only, Anxiety and Palpitations    Pineapple Tongue Swelling    Shellfish-Derived Products Throat Swelling     Happens sometimes with combination of seafood    Strawberry C [Ascorbate]      Strawberries sometimes tongue swells    Lactose GI Intolerance     intolerance    Pollen Extract Itching and Nasal Congestion     Receives allergy shots also allergy to dust mites/insects    Tree Extract Allergic Rhinitis       The following portions of the patient's history were reviewed and updated as appropriate: allergies, current medications, past family history, past medical history, past social history, past surgical history and problem list         Vitals:    08/11/20 0929   BP: 120/80   Pulse: 72   Resp: 16   Temp: 97 5 °F (36 4 °C)       Physical Exam  Constitutional:       Appearance: She is well-developed  HENT:      Head: Normocephalic and atraumatic  Cardiovascular:      Heart sounds: Normal heart sounds  Pulmonary:      Breath sounds: Normal breath sounds  Chest:      Breasts:         Right: No inverted nipple, mass, nipple discharge, skin change or tenderness  Left: Skin change (Lumpectomy scar) present   No inverted nipple, mass, nipple discharge or tenderness  Abdominal:      Palpations: Abdomen is soft  Lymphadenopathy:      Upper Body:      Right upper body: No supraclavicular, axillary or pectoral adenopathy  Left upper body: No supraclavicular, axillary or pectoral adenopathy  Neurological:      Mental Status: She is alert and oriented to person, place, and time  Results:  Labs:      Imaging  05/15/2020 bilateral 3D diagnostic mammogram as well as left breast ultrasound are benign BI-RADS two with a density of two    I reviewed the above imaging data  Discussion/Summary:  25-year-old female status post left breast conservation for ductal carcinoma in situ  She continues on anastrozole with mild side effects  There is no evidence of disease based on examination today or recent imaging  I will therefore see her again in six months or sooner should the need arise

## 2020-10-09 ENCOUNTER — ANNUAL EXAM (OUTPATIENT)
Dept: GYNECOLOGY | Facility: CLINIC | Age: 65
End: 2020-10-09
Payer: COMMERCIAL

## 2020-10-09 VITALS
HEIGHT: 65 IN | WEIGHT: 172 LBS | TEMPERATURE: 96.2 F | SYSTOLIC BLOOD PRESSURE: 160 MMHG | HEART RATE: 76 BPM | BODY MASS INDEX: 28.66 KG/M2 | DIASTOLIC BLOOD PRESSURE: 90 MMHG

## 2020-10-09 DIAGNOSIS — Z85.3 HISTORY OF BREAST CANCER: Primary | ICD-10-CM

## 2020-10-09 DIAGNOSIS — Z01.419 ENCOUNTER FOR GYNECOLOGICAL EXAMINATION (GENERAL) (ROUTINE) WITHOUT ABNORMAL FINDINGS: ICD-10-CM

## 2020-10-09 PROCEDURE — S0612 ANNUAL GYNECOLOGICAL EXAMINA: HCPCS | Performed by: OBSTETRICS & GYNECOLOGY

## 2020-12-15 ENCOUNTER — HOSPITAL ENCOUNTER (OUTPATIENT)
Dept: BONE DENSITY | Facility: MEDICAL CENTER | Age: 65
Discharge: HOME | End: 2020-12-15
Payer: COMMERCIAL

## 2020-12-15 DIAGNOSIS — M81.8 OSTEOPOROSIS DUE TO AROMATASE INHIBITOR: ICD-10-CM

## 2020-12-15 DIAGNOSIS — T38.6X5A OSTEOPOROSIS DUE TO AROMATASE INHIBITOR: ICD-10-CM

## 2020-12-15 PROCEDURE — 77080 DXA BONE DENSITY AXIAL: CPT

## 2021-01-27 ENCOUNTER — OFFICE VISIT (OUTPATIENT)
Dept: SURGICAL ONCOLOGY | Facility: CLINIC | Age: 66
End: 2021-01-27
Payer: COMMERCIAL

## 2021-01-27 ENCOUNTER — OFFICE VISIT (OUTPATIENT)
Dept: HEMATOLOGY ONCOLOGY | Facility: CLINIC | Age: 66
End: 2021-01-27
Payer: COMMERCIAL

## 2021-01-27 VITALS
DIASTOLIC BLOOD PRESSURE: 84 MMHG | HEIGHT: 66 IN | HEART RATE: 86 BPM | WEIGHT: 175 LBS | TEMPERATURE: 97.9 F | BODY MASS INDEX: 28.12 KG/M2 | SYSTOLIC BLOOD PRESSURE: 146 MMHG

## 2021-01-27 VITALS
BODY MASS INDEX: 28.12 KG/M2 | HEIGHT: 66 IN | WEIGHT: 175 LBS | SYSTOLIC BLOOD PRESSURE: 164 MMHG | HEART RATE: 78 BPM | RESPIRATION RATE: 18 BRPM | OXYGEN SATURATION: 98 % | TEMPERATURE: 98.7 F | DIASTOLIC BLOOD PRESSURE: 88 MMHG

## 2021-01-27 DIAGNOSIS — Z79.811 USE OF ANASTROZOLE: ICD-10-CM

## 2021-01-27 DIAGNOSIS — I10 ESSENTIAL HYPERTENSION: ICD-10-CM

## 2021-01-27 DIAGNOSIS — D05.12 DUCTAL CARCINOMA IN SITU (DCIS) OF LEFT BREAST: Primary | ICD-10-CM

## 2021-01-27 DIAGNOSIS — E80.4 GILBERT'S SYNDROME: ICD-10-CM

## 2021-01-27 PROCEDURE — 99213 OFFICE O/P EST LOW 20 MIN: CPT | Performed by: INTERNAL MEDICINE

## 2021-01-27 PROCEDURE — 99214 OFFICE O/P EST MOD 30 MIN: CPT | Performed by: SURGERY

## 2021-01-27 NOTE — PROGRESS NOTES
HPI:  Follow-up visit hormone receptor-positive, grade 1, 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 and patient had lumpectomy in September 2019  Patient has been on anastrozole 1 mg daily since October 2019  She did not receive radiation because on prelude trial score was very low 0 9  Patient has been taking vitamin-D and calcium and is staying active  She has osteopenia  She has high cholesterol and her family physician wants to monitor that and she is watching her diet she  She has lactose intolerance   She has arthritic symptoms  Less hot flashes than before     Current Outpatient Medications:     anastrozole (ARIMIDEX) 1 mg tablet, Take 1 tablet (1 mg total) by mouth daily, Disp: 90 tablet, Rfl: 3    CALCIUM PO, Take by mouth, Disp: , Rfl:     Cholecalciferol (VITAMIN D3) 5000 units CAPS, Take by mouth as needed , Disp: , Rfl:     fexofenadine (ALLEGRA) 180 MG tablet, daily as needed , Disp: , Rfl:     hydrocortisone 0 5 % cream, Apply topically 2 (two) times a day as needed, Disp: , Rfl:     lisinopril (ZESTRIL) 10 mg tablet, 10 mg daily , Disp: , Rfl:     metoprolol tartrate (LOPRESSOR) 50 mg tablet, daily , Disp: , Rfl:     Allergies   Allergen Reactions    Meperidine Nausea Only, Anxiety and Palpitations    Pineapple Tongue Swelling    Shellfish-Derived Products Throat Swelling     Happens sometimes with combination of seafood    Strawberry C [Ascorbate]      Strawberries sometimes tongue swells    Lactose GI Intolerance     intolerance    Pollen Extract Itching and Nasal Congestion     Receives allergy shots also allergy to dust mites/insects    Tree Extract Allergic Rhinitis       Oncology History   Ductal carcinoma in situ (DCIS) of left breast   9/9/2019 Initial Diagnosis    Ductal carcinoma in situ (DCIS) of left breast     9/9/2019 -  Cancer Staged    Staging form: Breast, AJCC 8th Edition  - Clinical: Stage 0 (cTis (DCIS), cN0, cM0, G1, ER: Unknown, GA: Unknown) - Signed by Jeovany Tyler MD on 9/9/2019  Laterality: Left  Method of lymph node assessment: Clinical  Histologic grading system: 3 grade system       10/15/2019 -  Cancer Staged    Staging form: Breast, AJCC 8th Edition  - Pathologic: Stage Unknown (pTis (DCIS), pNX, cM0, G2, ER+, PA+) - Signed by Katey Mims MD on 10/15/2019  Laterality: Left  Histologic grading system: 3 grade system           ROS:  01/27/21 Reviewed 13 systems: See symptoms in HPI:    Presently no neurological, cardiac, pulmonary, GI and  symptoms  Other symptoms are in HPI  No other symptoms like fever, chills, bleeding, bone pains, skin rash, weight loss,   weakness, numbness,  claudication and gait problem  No frequent infections  Not unusually sensitive to  cold  No swelling of the ankles  No swollen glands  Patient is less anxious  /88 (BP Location: Left arm, Patient Position: Sitting, Cuff Size: Adult)   Pulse 78   Temp 98 7 °F (37 1 °C)   Resp 18   Ht 5' 5 5" (1 664 m)   Wt 79 4 kg (175 lb)   SpO2 98%   BMI 28 68 kg/m²     Physical Exam:    My nurse Carlo was with me in the room during examination  Alert, oriented, not in distress, no icterus, no oral thrush, no palpable neck mass, clear lung fields, regular heart rate, abdomen  soft and non tender, no palpable abdominal mass, no ascites, no edema of ankles, no calf tenderness, no focal neurological deficit, no skin rash, no palpable lymphadenopathy in the neck and axillary areas, good arterial pulses  Patient is less anxious  Performance status 0     LABS:    Results for orders placed or performed in visit on 10/23/19   Ancillary Pathology Sendout (AP only)   Result Value Ref Range    Scan Result DCISION RT RESULTS      Labs, Imaging, & Other studies:   Normal chemistry profile except bilirubin 1 3  Near normal blood counts  High cholesterol and LDL    DXA bone density spine hip and pelvis  Status: Final result   PACS Images     Show images for DXA bone density spine hip and pelvis Study Result    CENTRAL  DXA SCAN     CLINICAL HISTORY:   60year old post-menopausal  female risk factors include estrogen deficiency  Personal history breast cancer 2019      TECHNIQUE: Bone densitometry was performed using a [employer] W bone densitometer  Regions of interest appear properly placed  There are no obvious fractures or other confounding variables which could limit the study      COMPARISON:  Baseline     RESULTS:   LUMBAR SPINE:  L1-L4:  BMD 0 924 gm/cm2  T-score below normal, -1 1  Z-score 0 6     LEFT TOTAL HIP:  BMD 0 847 gm/cm2  T-score normal, -0 8  Z-score 0 4     LEFT FEMORAL NECK:  BMD 0 793 gm/cm2  T-score normal, -0 5  Z-score +1 0           IMPRESSION:  1  Based on the [facility] classification, this study identifies a diagnosis of osteopenia, mild in degree at the spine area and the patient is considered at current low risk for fracture  All pertinent labs and imaging studies were personally reviewed      Reviewed and discussed with patient  Assessment and plan: Follow-up visit hormone receptor-positive, grade 1, 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 and patient had lumpectomy in September 2019  Patient has been on anastrozole 1 mg daily since October 2019  She did not receive radiation because on prelude trial score was very low 0 9  Patient has been taking vitamin-D and calcium and is staying active  She has osteopenia  She has high cholesterol and her family physician wants to monitor that and she is watching her diet she  She has lactose intolerance   She has arthritic symptoms  Less hot flashes than before  Roni Lea Physical examination and test results are as recorded and discussed  No change in therapy  Condition discussed and explained  Questions answered  She has slightly high bilirubin and that is mostly indirect bilirubin and she could have Gilbert's syndrome    Discussed the importance of self-breast examination, eating healthy foods, staying active and health screening tests   Patient is capable of self-care  She will come back in 6 months with blood counts and chemistry   Patient follows with her breast surgeon for examination and imaging studies       Discussed precautions against coronavirus  1  Ductal carcinoma in situ (DCIS) of left breast    - CBC and differential; Future  - Comprehensive metabolic panel; Future    2  Gilbert's syndrome      3  Use of anastrozole      4  Essential hypertension            Blood tests prior to next visit in 6 months    Patient voiced understanding and agreement in the discussion  Counseling / Coordination of Care   Greater than 50% of total time was spent with the patient and / or family counseling and / or coordination of care

## 2021-01-27 NOTE — PROGRESS NOTES
Surgical Oncology Follow Up       [address] [facility] SURGICAL ONCOLOGY [facility] [address]    Ramses Solorzano  1955  [id number]  [facility] SURGICAL ONCOLOGY [facility] [address]    Chief Complaint   Patient presents with    Follow-up       Assessment/Plan   Diagnoses and all orders for this visit:    Ductal carcinoma in situ (DCIS) of left breast  -     Mammo diagnostic bilateral w 3d & cad; Future    Use of anastrozole        Advance Care Planning/Advance Directives:  Discussed disease status, cancer treatment plans and/or cancer treatment goals with the patient  Oncology History:    Oncology History   Ductal carcinoma in situ (DCIS) of left breast   9/9/2019 Initial Diagnosis    Ductal carcinoma in situ (DCIS) of left breast     9/9/2019 -  Cancer Staged    Staging form: Breast, AJCC 8th Edition  - Clinical: Stage 0 (cTis (DCIS), cN0, cM0, G1, ER: Unknown, AR: Unknown) - Signed by Lawyer Umm MD on 9/9/2019  Laterality: Left  Method of lymph node assessment: Clinical  Histologic grading system: 3 grade system       10/15/2019 -  Cancer Staged    Staging form: Breast, AJCC 8th Edition  - Pathologic: Stage Unknown (pTis (DCIS), pNX, cM0, G2, ER+, AR+) - Signed by Lawyer Umm MD on 10/15/2019  Laterality: Left  Histologic grading system: 3 grade system           History of Present Illness:  Breast cancer follow-up, continues on anastrozole, no concerns  -Interval History:  None    Review of Systems:  Review of Systems   Constitutional: Negative for appetite change and fever  Hot flashes   Eyes: Negative  Respiratory: Negative for shortness of breath  Cardiovascular: Negative  Gastrointestinal: Negative  Endocrine: Negative  Genitourinary: Negative  Musculoskeletal: Positive for arthralgias (arthritis of hands)  Negative for myalgias  Skin: Negative  Allergic/Immunologic: Negative  Neurological: Negative  Hematological: Negative  Negative for adenopathy  Does not bruise/bleed easily  Psychiatric/Behavioral: Negative          Patient Active Problem List   Diagnosis    Ductal carcinoma in situ (DCIS) of left breast    Use of anastrozole    Gilbert's syndrome    Essential hypertension     Past Medical History:   Diagnosis Date    Anxiety     "time to time"    Arthritis     Dental crowns present     Eczema     Environmental and seasonal allergies     Food allergy     Hypertension     Irritable bowel syndrome     Lactose intolerance     Motion sickness     Osteoarthritis     mainly hands/feet/right hip    Osteopenia     Vaginal Pap smear 10/06/2017    WNL    Wears contact lenses     Wears glasses      Past Surgical History:   Procedure Laterality Date    BREAST BIOPSY Left     marker implanted  x2    BREAST BIOPSY Right 08/30/2019    benign    BREAST LUMPECTOMY Left 9/27/2019    Procedure: LUMPECTOMY BREAST NEEDLE LOCALIZED; 1200 NEEDLE LOC;  Surgeon: Yanira Ramirez MD;  [facility];  Service: Surgical Oncology    COLONOSCOPY      CYSTOSCOPY N/A 7/1/2019    Procedure: CYSTOSCOPY;  Surgeon: Koffi Howard MD;  [facility];  Service: UroGynecology           ENDOMETRIAL ABLATION      HAND SURGERY Left     cyst removed from palm    LIVER BIOPSY      x2 early 19's    MAMMO NEEDLE LOCALIZATION LEFT (ALL INC) Left 9/27/2019    MRI BREAST BIOPSY LEFT (ALL INCLUSIVE) Left 8/30/2019    MRI BREAST BIOPSY RIGHT (ALL INCLUSIVE) Right 8/30/2019    WA POST COLPORRHAPHY,RECTUM/VAGINA N/A 7/1/2019    Procedure: POSTERIOR COLPORRHAPHY;  Surgeon: Koffi Howard MD;  [facility];  Service: UroGynecology           PUBOVAGINAL SLING N/A 7/1/2019    Procedure: SINGLE INCISION SLING;  Surgeon: Koffi Howard MD;  [facility];  Service: UroGynecology           VAGINAL HYSTERECTOMY  01/01/2010    BSO AND REPAIR   UTEROVAGINAL PROLAPSE    WISDOM TOOTH EXTRACTION       Family History   Problem Relation Age of Onset    Lung cancer Family     Diabetes Family     Lung cancer Maternal Aunt     Lung cancer Father     No Known Problems Mother     No Known Problems Daughter     No Known Problems Maternal Grandmother     No Known Problems Maternal Grandfather     No Known Problems Paternal Grandmother     No Known Problems Paternal Grandfather     No Known Problems Daughter      Social History     Socioeconomic History    Marital status: /Civil Union     Spouse name: Not on file    Number of children: Not on file    Years of education: Not on file    Highest education level: Not on file   Occupational History    Not on file   Social Needs    Financial resource strain: Not on file    Food insecurity     Worry: Not on file     Inability: Not on file    Transportation needs     Medical: Not on file     Non-medical: Not on file   Tobacco Use    Smoking status: Never Smoker    Smokeless tobacco: Never Used   Substance and Sexual Activity    Alcohol use:  Yes     Alcohol/week: 1 0 standard drinks     Types: 1 Standard drinks or equivalent per week     Frequency: Monthly or less     Drinks per session: 1 or 2     Comment: "very infrequent"    Drug use: No    Sexual activity: Yes   Lifestyle    Physical activity     Days per week: Not on file     Minutes per session: Not on file    Stress: Not on file   Relationships    Social connections     Talks on phone: Not on file     Gets together: Not on file     Attends Yazidism service: Not on file     Active member of club or organization: Not on file     Attends meetings of clubs or organizations: Not on file     Relationship status: Not on file    Intimate partner violence     Fear of current or ex partner: Not on file     Emotionally abused: Not on file     Physically abused: Not on file     Forced sexual activity: Not on file   Other Topics Concern  Not on file   Social History Narrative    Not on file       Current Outpatient Medications:     anastrozole (ARIMIDEX) 1 mg tablet, Take 1 tablet (1 mg total) by mouth daily, Disp: 90 tablet, Rfl: 3    CALCIUM PO, Take by mouth, Disp: , Rfl:     Cholecalciferol (VITAMIN D3) 5000 units CAPS, Take by mouth as needed , Disp: , Rfl:     fexofenadine (ALLEGRA) 180 MG tablet, daily as needed , Disp: , Rfl:     hydrocortisone 0 5 % cream, Apply topically 2 (two) times a day as needed, Disp: , Rfl:     lisinopril (ZESTRIL) 10 mg tablet, 10 mg daily , Disp: , Rfl:     metoprolol tartrate (LOPRESSOR) 50 mg tablet, daily , Disp: , Rfl:   Allergies   Allergen Reactions    Meperidine Nausea Only, Anxiety and Palpitations    Pineapple Tongue Swelling    Shellfish-Derived Products Throat Swelling     Happens sometimes with combination of seafood    Strawberry C [Ascorbate]      Strawberries sometimes tongue swells    Lactose GI Intolerance     intolerance    Pollen Extract Itching and Nasal Congestion     Receives allergy shots also allergy to dust mites/insects    Tree Extract Allergic Rhinitis       The following portions of the patient's history were reviewed and updated as appropriate: allergies, current medications, past family history, past medical history, past social history, past surgical history and problem list         Vitals:    01/27/21 1148   BP: 146/84   Pulse: 86   Temp: 97 9 °F (36 6 °C)       Physical Exam  Constitutional:       General: She is not in acute distress  Appearance: She is well-developed  HENT:      Head: Normocephalic and atraumatic  Cardiovascular:      Heart sounds: Normal heart sounds  Pulmonary:      Breath sounds: Normal breath sounds  Chest:      Breasts:         Right: No inverted nipple, mass, nipple discharge, skin change or tenderness  Left: Skin change (lumpectomy scar) present  No inverted nipple, mass, nipple discharge or tenderness     Abdominal: Palpations: Abdomen is soft  Lymphadenopathy:      Upper Body:      Right upper body: No supraclavicular, axillary or pectoral adenopathy  Left upper body: No supraclavicular, axillary or pectoral adenopathy  Neurological:      Mental Status: She is alert and oriented to person, place, and time  Psychiatric:         Mood and Affect: Mood normal            Results:  Labs:    Imaging  05/15/2020 bilateral 3D diagnostic mammogram was benign    I reviewed the above imaging data  Discussion/Summary:  36-year-old female status post left breast conservation for ductal carcinoma in situ  She continues on Arimidex  There is no evidence of disease based on examination today  I will make arrangements for her mammogram due in May  I will see her again in six months or sooner should the need arise

## 2021-03-10 DIAGNOSIS — Z23 ENCOUNTER FOR IMMUNIZATION: ICD-10-CM

## 2021-05-21 ENCOUNTER — HOSPITAL ENCOUNTER (OUTPATIENT)
Dept: MAMMOGRAPHY | Facility: CLINIC | Age: 66
Discharge: HOME | End: 2021-05-21
Payer: COMMERCIAL

## 2021-05-21 VITALS — HEIGHT: 66 IN | WEIGHT: 175 LBS | BODY MASS INDEX: 28.12 KG/M2

## 2021-05-21 DIAGNOSIS — D05.12 DUCTAL CARCINOMA IN SITU (DCIS) OF LEFT BREAST: ICD-10-CM

## 2021-05-21 PROCEDURE — G0279 TOMOSYNTHESIS, MAMMO: HCPCS

## 2021-05-21 PROCEDURE — 77066 DX MAMMO INCL CAD BI: CPT

## 2021-07-13 DIAGNOSIS — D05.12 DUCTAL CARCINOMA IN SITU (DCIS) OF LEFT BREAST: ICD-10-CM

## 2021-07-13 RX ORDER — ANASTROZOLE 1 MG/1
TABLET ORAL
Qty: 90 TABLET | Refills: 3 | Status: SHIPPED | OUTPATIENT
Start: 2021-07-13 | End: 2022-03-09 | Stop reason: SDUPTHER

## 2021-07-21 ENCOUNTER — OFFICE VISIT (OUTPATIENT)
Dept: HEMATOLOGY ONCOLOGY | Facility: CLINIC | Age: 66
End: 2021-07-21
Payer: COMMERCIAL

## 2021-07-21 ENCOUNTER — OFFICE VISIT (OUTPATIENT)
Dept: SURGICAL ONCOLOGY | Facility: CLINIC | Age: 66
End: 2021-07-21
Payer: COMMERCIAL

## 2021-07-21 VITALS
DIASTOLIC BLOOD PRESSURE: 82 MMHG | BODY MASS INDEX: 26.68 KG/M2 | HEIGHT: 66 IN | HEART RATE: 73 BPM | SYSTOLIC BLOOD PRESSURE: 140 MMHG | RESPIRATION RATE: 18 BRPM | TEMPERATURE: 98.8 F | WEIGHT: 166 LBS | OXYGEN SATURATION: 99 %

## 2021-07-21 VITALS
SYSTOLIC BLOOD PRESSURE: 140 MMHG | BODY MASS INDEX: 26.68 KG/M2 | HEIGHT: 66 IN | DIASTOLIC BLOOD PRESSURE: 82 MMHG | OXYGEN SATURATION: 99 % | TEMPERATURE: 98.8 F | WEIGHT: 166 LBS | RESPIRATION RATE: 18 BRPM | HEART RATE: 73 BPM

## 2021-07-21 DIAGNOSIS — D05.12 DUCTAL CARCINOMA IN SITU (DCIS) OF LEFT BREAST: Primary | ICD-10-CM

## 2021-07-21 DIAGNOSIS — Z79.811 USE OF ANASTROZOLE: ICD-10-CM

## 2021-07-21 PROCEDURE — 99214 OFFICE O/P EST MOD 30 MIN: CPT | Performed by: SURGERY

## 2021-07-21 PROCEDURE — 99214 OFFICE O/P EST MOD 30 MIN: CPT | Performed by: INTERNAL MEDICINE

## 2021-07-21 NOTE — PROGRESS NOTES
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    Since October 2019 patient has been on adjuvant Arimidex and has been tolerating that without much problem  Has minor arthritic symptoms and tolerable hot flashes  She did not receive radiation because on prelude trial score was very low 0 9  Patient has been taking vitamin-D and calcium and is staying active  She has osteopenia  She has high cholesterol and her family physician wants to monitor that and she is watching her diet  She has lactose intolerance     She  will be moving permanently to [facility] and will look for an oncologist locally    Allergies   Allergen Reactions    Meperidine Nausea Only, Anxiety and Palpitations    Pineapple - Food Allergy Tongue Swelling    Shellfish-Derived Products - Food Allergy Throat Swelling     Happens sometimes with combination of seafood    Strawberry C [Ascorbate - Food Allergy]      Strawberries sometimes tongue swells    Lactose - Food Allergy GI Intolerance     intolerance    Pollen Extract Itching and Nasal Congestion     Receives allergy shots also allergy to dust mites/insects    Tree Extract Allergic Rhinitis       Oncology History   Ductal carcinoma in situ (DCIS) of left breast   9/9/2019 Initial Diagnosis    Ductal carcinoma in situ (DCIS) of left breast     9/9/2019 -  Cancer Staged    Staging form: Breast, AJCC 8th Edition  - Clinical: Stage 0 (cTis (DCIS), cN0, cM0, G1, ER: Unknown, OH: Unknown) - Signed by Dougie Rodriguez MD on 9/9/2019  Laterality: Left  Method of lymph node assessment: Clinical  Histologic grading system: 3 grade system       10/15/2019 -  Cancer Staged    Staging form: Breast, AJCC 8th Edition  - Pathologic: Stage Unknown (pTis (DCIS), pNX, cM0, G2, ER+, OH+) - Signed by Dougie Rodriguez MD on 10/15/2019  Laterality: Left  Histologic grading system: 3 grade system           ROS:  07/21/21 Reviewed 12 systems: See symptoms in HPI:    Presently no neurological, cardiac, pulmonary, GI and  symptoms  No  fever, chills, bleeding, bone pains, skin rash, weight loss,   weakness, numbness,  claudication and gait problem  No frequent infections  Not unusually sensitive to  cold  No swelling of the ankles  No swollen glands  Patient is not anxious  /82 (BP Location: Left arm, Patient Position: Sitting, Cuff Size: Adult)   Pulse 73   Temp 98 8 °F (37 1 °C)   Resp 18   Ht 5' 5 5" (1 664 m)   Wt 75 3 kg (166 lb)   SpO2 99%   BMI 27 20 kg/m²     Physical Exam:    My nurse Dex Yang was with me in the room during examination  Alert, oriented, not in distress, no icterus, no oral thrush, no palpable neck mass, clear lung fields, regular heart rate, abdomen  soft and non tender, no palpable abdominal mass, no ascites, no edema of ankles, no calf tenderness, no focal neurological deficit, no skin rash, no palpable lymphadenopathy in the neck and axillary areas and no lymphedema   Patient is not anxious  Performance status 0  Unremarkable examination    LABS:    Results for orders placed or performed in visit on 10/23/19   Ancillary Pathology Sendout (AP only)   Result Value Ref Range    Scan Result DCISION RT RESULTS      Labs, Imaging, & Other studies:   CBC AND DIFFERENTIAL  Status: Final result   Imaging    CBC AND DIFFERENTIAL (Order: [id number]) - 7/10/2021  Lab Order Result Printout    CBC AND DIFFERENTIAL (Order #[id number]) on 7/10/21   External Results Report    Open External Results Report   Lab Component SmartPhrase Guide    CBC AND DIFFERENTIAL (Order #[id number]) on 7/10/21   Contains abnormal data CBC AND DIFFERENTIAL  Order: [id number]  (suggestion)  Result Information displayed in this report will not trend and may not trigger automated decision support      Ref Range & Units 7/10/21  7:04 AM   Hemoglobin 11 5 - 14 5 g/dL 14 4        Hematocrit 35 0 - 43 0 % 41 8        WBC 4 0 - 10 0 thou/cmm 10 0        RBC 3 70 - 4 70 mill/cmm 4 58        Platelet Count 014 - 350 thou/cmm 236        MPV 7 5 - 11 3 fL 9 3        MCV 80 - 100 fL 91        MCH 26 0 - 34 0 pg 31 5        MCHC 32 0 - 37 0 g/dL 34 5        RDW 12 0 - 16 0 % 11 9Low         Differential Type   AUTO        Absolute Neutrophils 1 8 - 7 8 thou/cmm 5 3        Absolute Lymphocytes 1 0 - 3 0 thou/cmm 3 7High         Absolute Monocytes 0 3 - 1 0 thou/cmm 0 7        Absolute Eosinophils 0 0 - 0 5 thou/cmm 0 2        Absolute Basophils 0 0 - 0 1 thou/cmm 0 1        Neutrophils  % 52        Lymphocytes  % 37        Monocytes  % 7        Eosinophils  % 3        Basophils  % 1        Specimen Collected: 07/10/21  7:04 AM Last Resulted: 07/10/21 10:27 AM   Received From: [address]  Result Received: 07/20/21     Component SmartPhrase Guide    COMPREHENSIVE METABOLIC PANEL (Order #[id number]) on 7/10/21   Questions    Collection Question Answer   Has the patient been fasting for 8 hours or more? Contains abnormal data COMPREHENSIVE METABOLIC PANEL  Order: [id number]  (suggestion)  Result Information displayed in this report will not trend and may not trigger automated decision support  Ref Range & Units 7/10/21  7:04 AM   Glucose 65 - 99 mg/dL 94        BUN 7 - 25 mg/dL 15        Creatinine 0 40 - 1 10 mg/dL 0 66        Sodium 135 - 145 mmol/L 141        Potassium 3 5 - 5 2 mmol/L 4 4        Chloride 100 - 109 mmol/L 108        Carbon Dioxide 23 - 31 mmol/L 26        Calcium 8 5 - 10 1 mg/dL 9 4        Alkaline Phosphatase 35 - 120 U/L 77        Albumin 3 5 - 4 8 g/dL 3 8        Bilirubin, Total 0 2 - 1 0 mg/dL 1 1High     Comment: Use of this assay is not recommended for patients undergoing treatment with eltrombopag due to the potential for falsely elevated results         Protein, Total 6 3 - 8 3 g/dL 6 7        AST <41 U/L 14        ALT <56 U/L 21        Anion Gap 3 - 11  7        eGFR, Non- >60 94        eGFR,  >60  109        eGFR Comment   Units: mL/min per 1 73 square meters    Comment:                                            Normal Function or Mild Renal     Disease (if clinically at risk):  >or=60   Moderately Decreased:                30-59   Severely Decreased:                  15-29   Renal Failure:                         <15                                              Please note that the eGFR is based on the CKD-EPI calculation, and is not intended to be used for drug dosing                                               Note: Calculated GFR may not be an accurate indicator of renal function if the patient's renal function is not in a steady state  Specimen Collected: 07/10/21  7:04 AM Last Resulted: 07/10/21 10:35 AM   Received From: [facility]  Result Received: 07/20/21  8:29 AM         Status: Final result   PACS Images     Show images for DXA bone density spine hip and pelvis   Study Result    CENTRAL  DXA SCAN     CLINICAL HISTORY:   60year old post-menopausal  female risk factors include estrogen deficiency  Personal history breast cancer 2019      TECHNIQUE: Bone densitometry was performed using a [employer]s W bone densitometer  Regions of interest appear properly placed  There are no obvious fractures or other confounding variables which could limit the study      COMPARISON:  Baseline     RESULTS:   LUMBAR SPINE:  L1-L4:  BMD 0 924 gm/cm2  T-score below normal, -1 1  Z-score 0 6     LEFT TOTAL HIP:  BMD 0 847 gm/cm2  T-score normal, -0 8  Z-score 0 4     LEFT FEMORAL NECK:  BMD 0 793 gm/cm2  T-score normal, -0 5  Z-score +1 0           IMPRESSION:  1  Based on the [facility] classification, this study identifies a diagnosis of osteopenia, mild in degree at the spine area and the patient is considered at current low risk for fracture         IMPRESSION:   No evidence of malignancy         ASSESSMENT/BI-RADS CATEGORY:  Left: 2 - Benign  Right: 1 - Negative  Overall: 2 - Benign     RECOMMENDATION:       - Diagnostic mammogram in 1 year for both breasts      Workstation ID: [id number]   Imaging    Mammo diagnostic bilateral w 3d & cad (Order: [id number]) - 5/21/2021      All pertinent labs and imaging studies were personally reviewed      Reviewed CBC D, CMP, DEXA scan and mammography and discussed with patient  Assessment and plan:    In  September 2019 patient had left breast lumpectomy for  hormone receptor-positive, grade 1, 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    Since October 2019 patient has been on adjuvant Arimidex and has been tolerating that without much problem  Has minor arthritic symptoms and tolerable hot flashes  She did not receive radiation because on prelude trial score was very low 0 9  Patient has been taking vitamin-D and calcium and is staying active  She has osteopenia  She has high cholesterol and her family physician wants to monitor that and she is watching her diet  She has lactose intolerance   She  will be moving permanently to [facility] and will look for an oncologist locally    Physical examination and test results are as recorded and discussed  No change in therapy  Condition discussed and explained  Questions answered  She has slightly high bilirubin and that is mostly indirect bilirubin and she could have Gilbert's syndrome  Discussed the importance of self-breast examination, eating healthy foods, staying active and health screening tests   Patient is capable of self-care  She will come back in 6 months with blood counts and chemistry if by that time she did not find an oncologist locally   Patient follows with her breast surgeon for examination and imaging studies       Discussed precautions against coronavirus     Patient will continue to follow with her primary physician and other consultants locally here or in [facility]  See diagnosis, orders and instructions    1   Ductal carcinoma in situ (DCIS) of left breast    - CBC and differential; Future  - Comprehensive metabolic panel; Future    Patient voiced understanding and agreed      Counseling / Coordination of Care       Provided counseling and support

## 2021-07-21 NOTE — PROGRESS NOTES
Surgical Oncology Follow Up       [address] [facility] SURGICAL ONCOLOGY [facility] [address]    Ryder Loera  1955  [id number]  [facility] SURGICAL ONCOLOGY [facility] [address]    Chief Complaint   Patient presents with    Follow-up       Assessment/Plan   Diagnoses and all orders for this visit:    Ductal carcinoma in situ (DCIS) of left breast  -     Mammo diagnostic bilateral w 3d & cad; Future    Use of anastrozole        Advance Care Planning/Advance Directives:  Discussed disease status, cancer treatment plans and/or cancer treatment goals with the patient  Oncology History:    Oncology History   Ductal carcinoma in situ (DCIS) of left breast   9/9/2019 Initial Diagnosis    Ductal carcinoma in situ (DCIS) of left breast     9/9/2019 -  Cancer Staged    Staging form: Breast, AJCC 8th Edition  - Clinical: Stage 0 (cTis (DCIS), cN0, cM0, G1, ER: Unknown, ID: Unknown) - Signed by Patrick Batres MD on 9/9/2019  Laterality: Left  Method of lymph node assessment: Clinical  Histologic grading system: 3 grade system       10/15/2019 -  Cancer Staged    Staging form: Breast, AJCC 8th Edition  - Pathologic: Stage Unknown (pTis (DCIS), pNX, cM0, G2, ER+, ID+) - Signed by Patrick Batres MD on 10/15/2019  Laterality: Left  Histologic grading system: 3 grade system           History of Present Illness:  Breast cancer follow-up, no concerns, continues on anastrozole  -Interval History: recent mammogram    Review of Systems:  Review of Systems   Constitutional: Negative  Negative for appetite change and fever  Eyes: Negative  Respiratory: Negative for shortness of breath  Cardiovascular: Negative  Gastrointestinal: Negative  Endocrine: Negative  Genitourinary: Negative  Musculoskeletal: Negative  Negative for arthralgias and myalgias  Skin: Negative      Allergic/Immunologic: Negative  Neurological: Negative  Hematological: Negative  Negative for adenopathy  Does not bruise/bleed easily  Psychiatric/Behavioral: Negative          Patient Active Problem List   Diagnosis    Ductal carcinoma in situ (DCIS) of left breast    Use of anastrozole    Gilbert's syndrome    Essential hypertension     Past Medical History:   Diagnosis Date    Anxiety     "time to time"    Arthritis     Dental crowns present     Eczema     Environmental and seasonal allergies     Food allergy     Hypertension     Irritable bowel syndrome     Lactose intolerance     Motion sickness     Osteoarthritis     mainly hands/feet/right hip    Osteopenia     Vaginal Pap smear 10/06/2017    WNL    Wears contact lenses     Wears glasses      Past Surgical History:   Procedure Laterality Date    BREAST BIOPSY Left 08/30/2019    us bx- neg    BREAST BIOPSY Left 08/30/2019    MRI bx- dcis    BREAST BIOPSY Right 08/30/2019    MRI bx- neg    BREAST LUMPECTOMY Left 9/27/2019    Procedure: LUMPECTOMY BREAST NEEDLE LOCALIZED; 1200 NEEDLE LOC;  Surgeon: Vasyl Pratt MD;  [facility];  Service: Surgical Oncology    COLONOSCOPY      CYSTOSCOPY N/A 7/1/2019    Procedure: CYSTOSCOPY;  Surgeon: Fernandez Sanford MD;  [facility];  Service: UroGynecology           ENDOMETRIAL ABLATION      HAND SURGERY Left     cyst removed from palm    LIVER BIOPSY      x2 early 19's    MAMMO NEEDLE LOCALIZATION LEFT (ALL INC) Left 9/27/2019    MRI BREAST BIOPSY LEFT (ALL INCLUSIVE) Left 8/30/2019    MRI BREAST BIOPSY RIGHT (ALL INCLUSIVE) Right 8/30/2019    UT POST COLPORRHAPHY,RECTUM/VAGINA N/A 7/1/2019    Procedure: POSTERIOR COLPORRHAPHY;  Surgeon: Fernandez Sanford MD;  [facility];  Service: UroGynecology           PUBOVAGINAL SLING N/A 7/1/2019    Procedure: SINGLE INCISION SLING;  Surgeon: Fernandez Sanford MD;  [facility];  Service: UroGynecology           VAGINAL HYSTERECTOMY  01/01/2010    BSO AND REPAIR   UTEROVAGINAL PROLAPSE    WISDOM TOOTH EXTRACTION       Family History   Problem Relation Age of Onset    Lung cancer Family     Diabetes Family     Lung cancer Maternal Aunt     Lung cancer Father     No Known Problems Mother     No Known Problems Daughter     No Known Problems Maternal Grandmother     No Known Problems Maternal Grandfather     No Known Problems Paternal Grandmother     No Known Problems Paternal Grandfather     No Known Problems Daughter      Social History     Socioeconomic History    Marital status: /Civil Union     Spouse name: Not on file    Number of children: Not on file    Years of education: Not on file    Highest education level: Not on file   Occupational History    Not on file   Tobacco Use    Smoking status: Never Smoker    Smokeless tobacco: Never Used   Substance and Sexual Activity    Alcohol use: Yes     Alcohol/week: 1 0 standard drinks     Types: 1 Standard drinks or equivalent per week     Comment: "very infrequent"    Drug use: No    Sexual activity: Yes   Other Topics Concern    Not on file   Social History Narrative    Not on file     Social Determinants of Health     Financial Resource Strain:     Difficulty of Paying Living Expenses:    Food Insecurity:     Worried About Running Out of Food in the Last Year:     Ran Out of Food in the Last Year:    Transportation Needs:     Lack of Transportation (Medical):      Lack of Transportation (Non-Medical):    Physical Activity:     Days of Exercise per Week:     Minutes of Exercise per Session:    Stress:     Feeling of Stress :    Social Connections:     Frequency of Communication with Friends and Family:     Frequency of Social Gatherings with Friends and Family:     Attends Congregational Services:     Active Member of Clubs or Organizations:     Attends Club or Organization Meetings:     Marital Status:    Intimate Partner Violence:     Fear of Current or Ex-Partner:     Emotionally Abused:     Physically Abused:     Sexually Abused:        Current Outpatient Medications:     anastrozole (ARIMIDEX) 1 mg tablet, TAKE 1 TABLET BY MOUTH EVERY DAY, Disp: 90 tablet, Rfl: 3    CALCIUM PO, Take by mouth, Disp: , Rfl:     Cholecalciferol (VITAMIN D3) 5000 units CAPS, Take by mouth as needed , Disp: , Rfl:     fexofenadine (ALLEGRA) 180 MG tablet, daily as needed , Disp: , Rfl:     hydrocortisone 0 5 % cream, Apply topically 2 (two) times a day as needed, Disp: , Rfl:     lisinopril (ZESTRIL) 10 mg tablet, 10 mg daily , Disp: , Rfl:     metoprolol tartrate (LOPRESSOR) 50 mg tablet, daily , Disp: , Rfl:   Allergies   Allergen Reactions    Meperidine Nausea Only, Anxiety and Palpitations    Pineapple - Food Allergy Tongue Swelling    Shellfish-Derived Products - Food Allergy Throat Swelling     Happens sometimes with combination of seafood    Strawberry C [Ascorbate - Food Allergy]      Strawberries sometimes tongue swells    Lactose - Food Allergy GI Intolerance     intolerance    Pollen Extract Itching and Nasal Congestion     Receives allergy shots also allergy to dust mites/insects    Tree Extract Allergic Rhinitis       The following portions of the patient's history were reviewed and updated as appropriate: allergies, current medications, past family history, past medical history, past social history, past surgical history and problem list         Vitals:    07/21/21 1437   BP: 140/82   Pulse: 73   Resp: 18   Temp: 98 8 °F (37 1 °C)   SpO2: 99%       Physical Exam  Constitutional:       General: She is not in acute distress  Appearance: She is well-developed  HENT:      Head: Normocephalic and atraumatic  Cardiovascular:      Heart sounds: Normal heart sounds  Pulmonary:      Breath sounds: Normal breath sounds  Chest:      Breasts:         Right: No inverted nipple, mass, nipple discharge, skin change or tenderness           Left: Skin change (  Lumpectomy scar) present  No inverted nipple, mass, nipple discharge or tenderness  Abdominal:      Palpations: Abdomen is soft  Lymphadenopathy:      Upper Body:      Right upper body: No supraclavicular, axillary or pectoral adenopathy  Left upper body: No supraclavicular, axillary or pectoral adenopathy  Neurological:      Mental Status: She is alert and oriented to person, place, and time  Psychiatric:         Mood and Affect: Mood normal            Results:  Labs:      Imaging   05/21/2021 bilateral 3D diagnostic mammogram is benign BI-RADS two with a density of two    I reviewed the above imaging data  Discussion/Summary: 71-year-old female status post left breast conservation for ductal carcinoma in Situ  She did not have any radiation but does continue on anastrozole with no concerns  There is no evidence of disease based on examination today  Her recent mammogram was benign  We discussed going to annual exams as she will be moving out of the area  I will make her arrangements for the mammogram for next year and plan to see her again in one year or sooner should the need arise

## 2022-01-18 ENCOUNTER — TELEPHONE (OUTPATIENT)
Dept: HEMATOLOGY ONCOLOGY | Facility: CLINIC | Age: 67
End: 2022-01-18

## 2022-01-18 NOTE — TELEPHONE ENCOUNTER
Reschedule Appointment     Who is calling in  Patient   Doctor Appointment Scheduled with Alexsander Adam date and time 1-26-22 @ 9:00am    [facility] date and time 3-9-22 @ 2:00pm   Location [place]    Patient verbalized understanding

## 2022-02-08 ENCOUNTER — TELEPHONE (OUTPATIENT)
Dept: HEMATOLOGY ONCOLOGY | Facility: CLINIC | Age: 67
End: 2022-02-08

## 2022-02-08 NOTE — TELEPHONE ENCOUNTER
Spoke with patient about appointment change  Asked patient if she wanted to be seen sooner with Luigi Hankins and she agreed

## 2022-03-09 ENCOUNTER — OFFICE VISIT (OUTPATIENT)
Dept: HEMATOLOGY ONCOLOGY | Facility: CLINIC | Age: 67
End: 2022-03-09
Payer: MEDICARE

## 2022-03-09 VITALS
WEIGHT: 166 LBS | OXYGEN SATURATION: 98 % | RESPIRATION RATE: 18 BRPM | DIASTOLIC BLOOD PRESSURE: 80 MMHG | BODY MASS INDEX: 26.68 KG/M2 | TEMPERATURE: 98.5 F | HEIGHT: 66 IN | SYSTOLIC BLOOD PRESSURE: 138 MMHG | HEART RATE: 80 BPM

## 2022-03-09 DIAGNOSIS — Z79.811 USE OF ANASTROZOLE: ICD-10-CM

## 2022-03-09 DIAGNOSIS — D05.12 DUCTAL CARCINOMA IN SITU (DCIS) OF LEFT BREAST: Primary | ICD-10-CM

## 2022-03-09 PROCEDURE — 99214 OFFICE O/P EST MOD 30 MIN: CPT | Performed by: PHYSICIAN ASSISTANT

## 2022-03-09 RX ORDER — ANASTROZOLE 1 MG/1
1 TABLET ORAL DAILY
Qty: 30 TABLET | Refills: 11 | Status: SHIPPED | OUTPATIENT
Start: 2022-03-09

## 2022-03-09 NOTE — PROGRESS NOTES
Hematology/Oncology Outpatient Follow-up  Lucina Lopez 73 y o  female 1955 [id number]    Date:  3/9/2022      Assessment and Plan:  1  Ductal carcinoma in situ (DCIS) of left breast, 2  Use of anastrozole  43-year-old female presents for follow-up regarding history of breast cancer diagnosed in 2019  She is on adjuvant Arimidex  She is tolerating well  Refill sent to her pharmacy  CBC and CMP are acceptable  Her DEXA scan will be due in December 2022  She lives at the area  She will try to schedule this at [facility] for after December 15th  Follow-up in 6 months with labs  - CBC and differential; Future  - Comprehensive metabolic panel; Future  - DXA bone density spine hip and pelvis; Future    HPI:  Oncology History   Ductal carcinoma in situ (DCIS) of left breast   9/9/2019 Initial Diagnosis    Ductal carcinoma in situ (DCIS) of left breast     9/9/2019 -  Cancer Staged    Staging form: Breast, AJCC 8th Edition  - Clinical: Stage 0 (cTis (DCIS), cN0, cM0, G1, ER: Unknown, IN: Unknown) - Signed by Leona Yanes MD on 9/9/2019  Laterality: Left  Method of lymph node assessment: Clinical  Histologic grading system: 3 grade system       10/15/2019 -  Cancer Staged    Staging form: Breast, AJCC 8th Edition  - Pathologic: Stage Unknown (pTis (DCIS), pNX, cM0, G2, ER+, IN+) - Signed by Leona Yanes MD on 10/15/2019  Laterality: Left  Histologic grading system: 3 grade system         43-year-old female presents for follow-up regarding history of left-sided breast cancer  This was diagnosed in September 2019  She underwent lumpectomy for hormone receptor positive, grade 1, 3 mm DCIS the left breast   Since October 2019 she has been on adjuvant Arimidex  She has been tolerating this well  She has chronic mild arthritis that she does not feel is worse with Arimidex  She had hot flashes at the beginning of taking this medication but does not have at this time  She takes calcium and vitamin-D    DEXA scan is up-to-date  Interval history: Patient is living with her daughter and father in law taking care of her twin granddaughters     ROS: Review of Systems   Constitutional: Positive for fatigue  Negative for appetite change, chills, fever and unexpected weight change  HENT: Negative for mouth sores and nosebleeds  Respiratory: Negative for cough and shortness of breath  Cardiovascular: Negative for chest pain, palpitations and leg swelling  Gastrointestinal: Negative for abdominal pain, blood in stool, constipation, diarrhea, nausea and vomiting  Genitourinary: Negative for difficulty urinating, dysuria and hematuria  Musculoskeletal: Positive for arthralgias  Skin: Negative  Neurological: Negative for dizziness, weakness, light-headedness, numbness and headaches  Hematological: Negative  Psychiatric/Behavioral: Negative          Past Medical History:   Diagnosis Date    Anxiety     "time to time"    Arthritis     Dental crowns present     Eczema     Environmental and seasonal allergies     Food allergy     Hypertension     Irritable bowel syndrome     Lactose intolerance     Motion sickness     Osteoarthritis     mainly hands/feet/right hip    Osteopenia     Vaginal Pap smear 10/06/2017    WNL    Wears contact lenses     Wears glasses        Past Surgical History:   Procedure Laterality Date    BREAST BIOPSY Left 08/30/2019    us bx- neg    BREAST BIOPSY Left 08/30/2019    MRI bx- dcis    BREAST BIOPSY Right 08/30/2019    MRI bx- neg    BREAST LUMPECTOMY Left 9/27/2019    Procedure: LUMPECTOMY BREAST NEEDLE LOCALIZED; 1200 NEEDLE LOC;  Surgeon: Cee Brink MD;  [facility];  Service: Surgical Oncology    COLONOSCOPY      CYSTOSCOPY N/A 7/1/2019    Procedure: Radha Cavanaugh;  Surgeon: Glynn Gonzalez MD;  [facility];  Service: UroGynecology           ENDOMETRIAL ABLATION      HAND SURGERY Left     cyst removed from palm    LIVER BIOPSY      x2 early 19's    MAMMO NEEDLE LOCALIZATION LEFT (ALL INC) Left 9/27/2019    MRI BREAST BIOPSY LEFT (ALL INCLUSIVE) Left 8/30/2019    MRI BREAST BIOPSY RIGHT (ALL INCLUSIVE) Right 8/30/2019    [place] POST COLPORRHAPHY,RECTUM/VAGINA N/A 7/1/2019    Procedure: POSTERIOR COLPORRHAPHY;  Surgeon: Hazeline Jeans, MD;  [facility];  Service: UroGynecology           PUBOVAGINAL SLING N/A 7/1/2019    Procedure: SINGLE INCISION SLING;  Surgeon: Hazeline Jeans, MD;  [facility];  Service: UroGynecology           VAGINAL HYSTERECTOMY  01/01/2010    BSO AND REPAIR   UTEROVAGINAL PROLAPSE    WISDOM TOOTH EXTRACTION         Social History     Socioeconomic History    Marital status: /Civil Union     Spouse name: None    Number of children: None    Years of education: None    Highest education level: None   Occupational History    None   Tobacco Use    Smoking status: Never Smoker    Smokeless tobacco: Never Used   Substance and Sexual Activity    Alcohol use:  Yes     Alcohol/week: 1 0 standard drink     Types: 1 Standard drinks or equivalent per week     Comment: "very infrequent"    Drug use: No    Sexual activity: Yes   Other Topics Concern    None   Social History Narrative    None     Social Determinants of Health     Financial Resource Strain: Not on file   Food Insecurity: Not on file   Transportation Needs: Not on file   Physical Activity: Not on file   Stress: Not on file   Social Connections: Not on file   Intimate Partner Violence: Not on file   Housing Stability: Not on file       Family History   Problem Relation Age of Onset    Lung cancer Family     Diabetes Family     Lung cancer Maternal Aunt     Lung cancer Father     No Known Problems Mother     No Known Problems Daughter     No Known Problems Maternal Grandmother     No Known Problems Maternal Grandfather     No Known Problems Paternal Grandmother     No Known Problems Paternal Grandfather     No Known Problems Daughter        Allergies   Allergen Reactions    Meperidine Nausea Only, Anxiety and Palpitations    Pineapple - Food Allergy Tongue Swelling    Shellfish-Derived Products - Food Allergy Throat Swelling     Happens sometimes with combination of seafood    Strawberry C [Ascorbate - Food Allergy]      Strawberries sometimes tongue swells    Lactose - Food Allergy GI Intolerance     intolerance    Pollen Extract Itching and Nasal Congestion     Receives allergy shots also allergy to dust mites/insects    Tree Extract Allergic Rhinitis         Current Outpatient Medications:     anastrozole (ARIMIDEX) 1 mg tablet, TAKE 1 TABLET BY MOUTH EVERY DAY, Disp: 90 tablet, Rfl: 3    CALCIUM PO, Take by mouth, Disp: , Rfl:     Cholecalciferol (VITAMIN D3) 5000 units CAPS, Take by mouth as needed , Disp: , Rfl:     fexofenadine (ALLEGRA) 180 MG tablet, daily as needed , Disp: , Rfl:     hydrocortisone 0 5 % cream, Apply topically 2 (two) times a day as needed, Disp: , Rfl:     lisinopril (ZESTRIL) 10 mg tablet, 10 mg daily , Disp: , Rfl:     metoprolol tartrate (LOPRESSOR) 50 mg tablet, daily , Disp: , Rfl:     Physical Exam:  /80 (BP Location: Left arm, Patient Position: Sitting, Cuff Size: Adult)   Pulse 80   Temp 98 5 °F (36 9 °C) (Temporal)   Resp 18   Ht 5' 5 5" (1 664 m)   Wt 75 3 kg (166 lb)   SpO2 98%   BMI 27 20 kg/m²     Physical Exam  Vitals reviewed  Constitutional:       General: She is not in acute distress  Appearance: She is well-developed  She is not ill-appearing  HENT:      Head: Normocephalic and atraumatic  Eyes:      General: No scleral icterus  Conjunctiva/sclera: Conjunctivae normal    Cardiovascular:      Rate and Rhythm: Normal rate and regular rhythm  Heart sounds: Normal heart sounds  No murmur heard  Pulmonary:      Effort: Pulmonary effort is normal  No respiratory distress  Breath sounds: Normal breath sounds     Chest:   Breasts:      Right: No axillary adenopathy or supraclavicular adenopathy  Left: No axillary adenopathy or supraclavicular adenopathy  Abdominal:      Palpations: Abdomen is soft  Tenderness: There is no abdominal tenderness  Musculoskeletal:         General: No tenderness  Normal range of motion  Cervical back: Normal range of motion and neck supple  Right lower leg: No edema  Left lower leg: No edema  Lymphadenopathy:      Cervical: No cervical adenopathy  Upper Body:      Right upper body: No supraclavicular or axillary adenopathy  Left upper body: No supraclavicular or axillary adenopathy  Skin:     General: Skin is warm and dry  Neurological:      Mental Status: She is alert and oriented to person, place, and time  Cranial Nerves: No cranial nerve deficit  Psychiatric:         Mood and Affect: Mood normal          Behavior: Behavior normal          Labs:  Lab Results   Component Value Date    WBC 7 41 09/16/2019    HGB 13 6 09/16/2019    HCT 41 8 09/16/2019    MCV 96 09/16/2019     09/16/2019     Lab Results   Component Value Date    K 3 9 09/16/2019     09/16/2019    CO2 27 09/16/2019    BUN 8 09/16/2019    CREATININE 0 70 09/16/2019    GLUF 93 09/16/2019    CALCIUM 9 3 09/16/2019    AST 16 09/16/2019    ALT 21 09/16/2019    ALKPHOS 48 09/16/2019    EGFR 93 09/16/2019     Patient voiced understanding and agreement in the above discussion  Aware to contact our office with questions/symptoms in the interim  This note has been generated by voice recognition software system  Therefore, there may be spelling, grammar, and or syntax errors  Please contact if questions arise

## 2022-05-23 ENCOUNTER — HOSPITAL ENCOUNTER (OUTPATIENT)
Dept: MAMMOGRAPHY | Facility: CLINIC | Age: 67
Discharge: HOME | End: 2022-05-23
Payer: MEDICARE

## 2022-05-23 VITALS — BODY MASS INDEX: 26.68 KG/M2 | HEIGHT: 66 IN | WEIGHT: 166 LBS

## 2022-05-23 DIAGNOSIS — D05.12 DUCTAL CARCINOMA IN SITU (DCIS) OF LEFT BREAST: ICD-10-CM

## 2022-05-23 PROCEDURE — 77066 DX MAMMO INCL CAD BI: CPT

## 2022-05-23 PROCEDURE — G0279 TOMOSYNTHESIS, MAMMO: HCPCS

## 2022-07-20 ENCOUNTER — OFFICE VISIT (OUTPATIENT)
Dept: SURGICAL ONCOLOGY | Facility: CLINIC | Age: 67
End: 2022-07-20
Payer: MEDICARE

## 2022-07-20 VITALS
BODY MASS INDEX: 26.52 KG/M2 | DIASTOLIC BLOOD PRESSURE: 70 MMHG | WEIGHT: 165 LBS | HEIGHT: 66 IN | SYSTOLIC BLOOD PRESSURE: 120 MMHG | TEMPERATURE: 98.7 F

## 2022-07-20 DIAGNOSIS — Z79.811 USE OF ANASTROZOLE: ICD-10-CM

## 2022-07-20 DIAGNOSIS — D05.12 DUCTAL CARCINOMA IN SITU (DCIS) OF LEFT BREAST: Primary | ICD-10-CM

## 2022-07-20 PROCEDURE — 99214 OFFICE O/P EST MOD 30 MIN: CPT | Performed by: SURGERY

## 2022-07-20 NOTE — PROGRESS NOTES
Surgical Oncology Follow Up       [facility] SURGICAL ONCOLOGY [place]  [facility] [address]    Francine Owusurador  1955  [id number]  [address]  [facility] SURGICAL ONCOLOGY [address]  [facility] [id number]    Chief Complaint   Patient presents with    Follow-up     72 y/o female here for 1 year F/U visit        Assessment/Plan   Diagnoses and all orders for this visit:    Ductal carcinoma in situ (DCIS) of left breast  -     Mammo diagnostic bilateral w 3d & cad; Future    Use of anastrozole        Advance Care Planning/Advance Directives:  Discussed disease status, cancer treatment plans and/or cancer treatment goals with the patient  Oncology History:    Oncology History   Ductal carcinoma in situ (DCIS) of left breast   9/9/2019 Initial Diagnosis    Ductal carcinoma in situ (DCIS) of left breast     9/9/2019 -  Cancer Staged    Staging form: Breast, AJCC 8th Edition  - Clinical: Stage 0 (cTis (DCIS), cN0, cM0, G1, ER: Unknown, TN: Unknown) - Signed by Marnie Love MD on 9/9/2019  Laterality: Left  Method of lymph node assessment: Clinical  Histologic grading system: 3 grade system       10/15/2019 -  Cancer Staged    Staging form: Breast, AJCC 8th Edition  - Pathologic: Stage Unknown (pTis (DCIS), pNX, cM0, G2, ER+, TN+) - Signed by Marnie Love MD on 10/15/2019  Laterality: Left  Histologic grading system: 3 grade system           History of Present Illness:  Breast cancer follow-up, no breast referable concerns, continues on anastrozole  -Interval History:  Recent mammogram    Review of Systems:  Review of Systems   Constitutional: Negative  Negative for appetite change and fever  HENT:        Thinning of hair   Eyes: Negative  Respiratory: Negative for shortness of breath  Cardiovascular: Negative  Gastrointestinal: Negative  Endocrine: Negative  Genitourinary: Negative      Musculoskeletal: Negative  Negative for arthralgias and myalgias  Skin: Negative  Allergic/Immunologic: Negative  Neurological: Negative  Hematological: Negative  Negative for adenopathy  Does not bruise/bleed easily  Psychiatric/Behavioral: Negative          Patient Active Problem List   Diagnosis    Ductal carcinoma in situ (DCIS) of left breast    Use of anastrozole    Gilbert's syndrome    Essential hypertension     Past Medical History:   Diagnosis Date    Anxiety     "time to time"    Arthritis     Dental crowns present     Eczema     Environmental and seasonal allergies     Food allergy     Hypertension     Irritable bowel syndrome     Lactose intolerance     Motion sickness     Osteoarthritis     mainly hands/feet/right hip    Osteopenia     Vaginal Pap smear 10/06/2017    WNL    Wears contact lenses     Wears glasses      Past Surgical History:   Procedure Laterality Date    BREAST BIOPSY Left 08/30/2019    us bx- neg    BREAST BIOPSY Left 08/30/2019    MRI bx- dcis    BREAST BIOPSY Right 08/30/2019    MRI bx- neg    BREAST LUMPECTOMY Left 09/27/2019    Procedure: LUMPECTOMY BREAST NEEDLE LOCALIZED; 1200 NEEDLE LOC;  Surgeon: Roro Gonzáles MD;  [facility];  Service: Surgical Oncology    COLONOSCOPY      CYSTOSCOPY N/A 07/01/2019    Procedure: CYSTOSCOPY;  Surgeon: Idalia Monteiro MD;  [facility];  Service: UroGynecology           ENDOMETRIAL ABLATION      HAND SURGERY Left     cyst removed from palm    LIVER BIOPSY      x2 early 19's    MAMMO NEEDLE LOCALIZATION LEFT (ALL INC) Left 09/27/2019    MRI BREAST BIOPSY LEFT (ALL INCLUSIVE) Left 08/30/2019    MRI BREAST BIOPSY RIGHT (ALL INCLUSIVE) Right 08/30/2019    [place] POST COLPORRHAPHY,RECTUM/VAGINA N/A 07/01/2019    Procedure: POSTERIOR COLPORRHAPHY;  Surgeon: Idalia Monteiro MD;  [facility];  Service: UroGynecology           PUBOVAGINAL SLING N/A 07/01/2019    Procedure: SINGLE INCISION SLING; Surgeon: Caryl Katz MD;  Location: [facility];  Service: UroGynecology           VAGINAL HYSTERECTOMY  01/01/2010    BSO AND REPAIR   UTEROVAGINAL PROLAPSE    WISDOM TOOTH EXTRACTION      WRIST SURGERY      4/2022     Family History   Problem Relation Age of Onset    No Known Problems Mother     Lung cancer Father     No Known Problems Daughter     No Known Problems Daughter     No Known Problems Maternal Grandmother     No Known Problems Maternal Grandfather     No Known Problems Paternal Grandmother     No Known Problems Paternal Grandfather     Lung cancer Maternal Aunt     Lung cancer Family     Diabetes Family      Social History     Socioeconomic History    Marital status: /Civil Union     Spouse name: Not on file    Number of children: Not on file    Years of education: Not on file    Highest education level: Not on file   Occupational History    Not on file   Tobacco Use    Smoking status: Never Smoker    Smokeless tobacco: Never Used   Vaping Use    Vaping Use: Never used   Substance and Sexual Activity    Alcohol use:  Yes     Alcohol/week: 1 0 standard drink     Types: 1 Standard drinks or equivalent per week     Comment: "very infrequent"    Drug use: No    Sexual activity: Yes   Other Topics Concern    Not on file   Social History Narrative    Not on file     Social Determinants of Health     Financial Resource Strain: Not on file   Food Insecurity: Not on file   Transportation Needs: Not on file   Physical Activity: Not on file   Stress: Not on file   Social Connections: Not on file   Intimate Partner Violence: Not on file   Housing Stability: Not on file       Current Outpatient Medications:     anastrozole (ARIMIDEX) 1 mg tablet, Take 1 tablet (1 mg total) by mouth daily, Disp: 30 tablet, Rfl: 11    CALCIUM PO, Take by mouth, Disp: , Rfl:     Cholecalciferol (VITAMIN D3) 5000 units CAPS, Take by mouth as needed , Disp: , Rfl:     fexofenadine (ALLEGRA) 180 MG tablet, daily as needed , Disp: , Rfl:     hydrocortisone 0 5 % cream, Apply topically 2 (two) times a day as needed, Disp: , Rfl:     lisinopril (ZESTRIL) 10 mg tablet, 10 mg daily , Disp: , Rfl:     metoprolol tartrate (LOPRESSOR) 50 mg tablet, daily , Disp: , Rfl:   Allergies   Allergen Reactions    Meperidine Nausea Only, Anxiety and Palpitations    Pineapple - Food Allergy Tongue Swelling    Shellfish-Derived Products - Food Allergy Throat Swelling     Happens sometimes with combination of seafood    Strawberry C [Ascorbate - Food Allergy]      Strawberries sometimes tongue swells    Lactose - Food Allergy GI Intolerance     intolerance    Pollen Extract Itching and Nasal Congestion     Receives allergy shots also allergy to dust mites/insects    Tree Extract Allergic Rhinitis       The following portions of the patient's history were reviewed and updated as appropriate: allergies, current medications, past family history, past medical history, past social history, past surgical history and problem list         Vitals:    07/20/22 0845   BP: 120/70   Temp: 98 7 °F (37 1 °C)       Physical Exam  Constitutional:       General: She is not in acute distress  Appearance: Normal appearance  She is well-developed  HENT:      Head: Normocephalic and atraumatic  Cardiovascular:      Heart sounds: Normal heart sounds  Pulmonary:      Breath sounds: Normal breath sounds  Chest:   Breasts:      Right: No inverted nipple, mass, nipple discharge, skin change, tenderness, axillary adenopathy or supraclavicular adenopathy  Left: Skin change (Lumpectomy scar) present  No inverted nipple, mass, nipple discharge, tenderness, axillary adenopathy or supraclavicular adenopathy  Abdominal:      Palpations: Abdomen is soft  Lymphadenopathy:      Upper Body:      Right upper body: No supraclavicular, axillary or pectoral adenopathy        Left upper body: No supraclavicular, axillary or pectoral adenopathy  Neurological:      Mental Status: She is alert and oriented to person, place, and time  Psychiatric:         Mood and Affect: Mood normal            Results:  Labs:      Imaging  05/23/2022 bilateral 3D diagnostic mammogram was benign BI-RADS two with a density of two    I reviewed the above imaging data  Discussion/Summary:  29-year-old female status post left breast conservation for ductal carcinoma in-situ  She did not have any radiation secondary to a low risk dcision RT  She does continue on anastrozole and reports hair thinning but no other concerns  There is no evidence of disease based on exam today  Her recent mammogram was benign  Given her out of town location, we will see her again next year at the time of her mammogram or sooner should the need arise

## 2022-09-13 ENCOUNTER — OFFICE VISIT (OUTPATIENT)
Dept: HEMATOLOGY ONCOLOGY | Facility: CLINIC | Age: 67
End: 2022-09-13
Payer: MEDICARE

## 2022-09-13 VITALS
DIASTOLIC BLOOD PRESSURE: 80 MMHG | HEART RATE: 74 BPM | RESPIRATION RATE: 18 BRPM | WEIGHT: 169.5 LBS | SYSTOLIC BLOOD PRESSURE: 148 MMHG | HEIGHT: 66 IN | OXYGEN SATURATION: 97 % | TEMPERATURE: 97.6 F | BODY MASS INDEX: 27.24 KG/M2

## 2022-09-13 DIAGNOSIS — D05.12 DUCTAL CARCINOMA IN SITU (DCIS) OF LEFT BREAST: ICD-10-CM

## 2022-09-13 PROCEDURE — 99214 OFFICE O/P EST MOD 30 MIN: CPT | Performed by: PHYSICIAN ASSISTANT

## 2022-09-13 RX ORDER — IPRATROPIUM BROMIDE 21 UG/1
SPRAY, METERED NASAL
COMMUNITY
Start: 2022-09-13

## 2022-09-13 RX ORDER — ANASTROZOLE 1 MG/1
1 TABLET ORAL DAILY
Qty: 90 TABLET | Refills: 3 | Status: SHIPPED | OUTPATIENT
Start: 2022-09-13

## 2022-09-13 RX ORDER — FLUTICASONE PROPIONATE 50 MCG
SPRAY, SUSPENSION (ML) NASAL
COMMUNITY
Start: 2022-03-29

## 2022-09-13 NOTE — PROGRESS NOTES
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 breast  67-year-old female presents for follow-up regarding history of breast cancer diagnosed in 2019  She is on adjuvant Arimidex  She is tolerating well  Labs reviewed and acceptable  DEXA is due in Dec and she plans to complete at hospital near her house in [facility]  Refills sent  Follow up in 6 months      - anastrozole (ARIMIDEX) 1 mg tablet; Take 1 tablet (1 mg total) by mouth daily  Dispense: 90 tablet; Refill: 3  - CBC and differential; Future  - Comprehensive metabolic panel; Future      HPI:  Oncology History   Ductal carcinoma in situ (DCIS) of left breast   9/9/2019 Initial Diagnosis    Ductal carcinoma in situ (DCIS) of left breast     9/9/2019 -  Cancer Staged    Staging form: Breast, AJCC 8th Edition  - Clinical: Stage 0 (cTis (DCIS), cN0, cM0, G1, ER: Unknown, OK: Unknown) - Signed by Rafia Stokes MD on 9/9/2019  Laterality: Left  Method of lymph node assessment: Clinical  Histologic grading system: 3 grade system       10/15/2019 -  Cancer Staged    Staging form: Breast, AJCC 8th Edition  - Pathologic: Stage Unknown (pTis (DCIS), pNX, cM0, G2, ER+, OK+) - Signed by Rafia Stokes MD on 10/15/2019  Laterality: Left  Histologic grading system: 3 grade system         67-year-old female presents for follow-up regarding history of left-sided breast cancer  This was diagnosed in September 2019  She underwent lumpectomy for hormone receptor positive, grade 1, 3 mm DCIS the left breast   Since October 2019 she has been on adjuvant Arimidex  She has been tolerating this well  She has chronic mild arthritis that she does not feel is worse with Arimidex  She had hot flashes at the beginning of taking this medication but does not have at this time      She takes calcium and vitamin-D  DEXA scan is up-to-date      Interval history: no new complaints     ROS: Review of Systems   Constitutional: Positive for fatigue  Negative for appetite change, chills, fever and unexpected weight change  Respiratory: Negative for cough and shortness of breath  Cardiovascular: Negative for chest pain, palpitations and leg swelling  Gastrointestinal: Negative for abdominal pain, constipation, diarrhea, nausea and vomiting  Endocrine:        Occasional hot flashes    Genitourinary: Negative for difficulty urinating, dysuria and hematuria  Musculoskeletal: Positive for arthralgias  Skin: Negative  Neurological: Negative for dizziness, weakness, light-headedness, numbness and headaches  Hematological: Negative  Psychiatric/Behavioral: Negative        Past Medical History:   Diagnosis Date    Anxiety     "time to time"    Arthritis     Dental crowns present     Eczema     Environmental and seasonal allergies     Food allergy     Hypertension     Irritable bowel syndrome     Lactose intolerance     Motion sickness     Osteoarthritis     mainly hands/feet/right hip    Osteopenia     Vaginal Pap smear 10/06/2017    WNL    Wears contact lenses     Wears glasses        Past Surgical History:   Procedure Laterality Date    BREAST BIOPSY Left 08/30/2019    us bx- neg    BREAST BIOPSY Left 08/30/2019    MRI bx- dcis    BREAST BIOPSY Right 08/30/2019    MRI bx- neg    BREAST LUMPECTOMY Left 09/27/2019    Procedure: LUMPECTOMY BREAST NEEDLE LOCALIZED; 1200 NEEDLE LOC;  Surgeon: Cee Brink MD;  [facility];  Service: Surgical Oncology    COLONOSCOPY      CYSTOSCOPY N/A 07/01/2019    Procedure: CYSTOSCOPY;  Surgeon: Glynn Gonzalez MD;  [facility];  Service: UroGynecology           ENDOMETRIAL ABLATION      HAND SURGERY Left     cyst removed from palm    LIVER BIOPSY      x2 early 19's    MAMMO NEEDLE LOCALIZATION LEFT (ALL INC) Left 09/27/2019    MRI BREAST BIOPSY LEFT (ALL INCLUSIVE) Left 08/30/2019  MRI BREAST BIOPSY RIGHT (ALL INCLUSIVE) Right 08/30/2019    NM POST COLPORRHAPHY,RECTUM/VAGINA N/A 07/01/2019    Procedure: POSTERIOR COLPORRHAPHY;  Surgeon: Jayne Woods MD;  [facility];  Service: UroGynecology           PUBOVAGINAL SLING N/A 07/01/2019    Procedure: SINGLE INCISION SLING;  Surgeon: Jayne Woods MD;  [facility];  Service: UroGynecology           VAGINAL HYSTERECTOMY  01/01/2010    BSO AND REPAIR   UTEROVAGINAL PROLAPSE    WISDOM TOOTH EXTRACTION      WRIST SURGERY      4/2022       Social History     Socioeconomic History    Marital status: /Civil Union     Spouse name: None    Number of children: None    Years of education: None    Highest education level: None   Occupational History    None   Tobacco Use    Smoking status: Never Smoker    Smokeless tobacco: Never Used   Vaping Use    Vaping Use: Never used   Substance and Sexual Activity    Alcohol use:  Yes     Alcohol/week: 1 0 standard drink     Types: 1 Standard drinks or equivalent per week     Comment: "very infrequent"    Drug use: No    Sexual activity: Yes   Other Topics Concern    None   Social History Narrative    None     Social Determinants of Health     Financial Resource Strain: Not on file   Food Insecurity: Not on file   Transportation Needs: Not on file   Physical Activity: Not on file   Stress: Not on file   Social Connections: Not on file   Intimate Partner Violence: Not on file   Housing Stability: Not on file       Family History   Problem Relation Age of Onset    No Known Problems Mother     Lung cancer Father     No Known Problems Daughter     No Known Problems Daughter     No Known Problems Maternal Grandmother     No Known Problems Maternal Grandfather     No Known Problems Paternal Grandmother     No Known Problems Paternal Grandfather     Lung cancer Maternal Aunt     Lung cancer Family     Diabetes Family        Allergies   Allergen Reactions    Meperidine Nausea Only, Anxiety and Palpitations    Pineapple - Food Allergy Tongue Swelling    Shellfish-Derived Products - Food Allergy Throat Swelling     Happens sometimes with combination of seafood    Strawberry C [Ascorbate - Food Allergy]      Strawberries sometimes tongue swells    Lactose - Food Allergy GI Intolerance     intolerance    Pollen Extract Itching and Nasal Congestion     Receives allergy shots also allergy to dust mites/insects    Tree Extract Allergic Rhinitis         Current Outpatient Medications:     anastrozole (ARIMIDEX) 1 mg tablet, Take 1 tablet (1 mg total) by mouth daily, Disp: 30 tablet, Rfl: 11    CALCIUM PO, Take by mouth, Disp: , Rfl:     Cholecalciferol (VITAMIN D3) 5000 units CAPS, Take by mouth as needed , Disp: , Rfl:     fexofenadine (ALLEGRA) 180 MG tablet, daily as needed , Disp: , Rfl:     fluticasone (FLONASE) 50 mcg/act nasal spray, , Disp: , Rfl:     hydrocortisone 0 5 % cream, Apply topically 2 (two) times a day as needed, Disp: , Rfl:     ipratropium (ATROVENT) 0 03 % nasal spray, , Disp: , Rfl:     lisinopril (ZESTRIL) 10 mg tablet, 10 mg daily , Disp: , Rfl:     metoprolol tartrate (LOPRESSOR) 50 mg tablet, daily , Disp: , Rfl:     Physical Exam:  /80 (BP Location: Left arm, Patient Position: Sitting, Cuff Size: Adult)   Pulse 74   Temp 97 6 °F (36 4 °C) (Tympanic)   Resp 18   Ht 5' 5 5" (1 664 m)   Wt 76 9 kg (169 lb 8 oz)   SpO2 97%   BMI 27 78 kg/m²     Physical Exam  Vitals reviewed  Constitutional:       General: She is not in acute distress  Appearance: She is well-developed  She is not ill-appearing  HENT:      Head: Normocephalic and atraumatic  Eyes:      General: No scleral icterus  Conjunctiva/sclera: Conjunctivae normal    Cardiovascular:      Rate and Rhythm: Normal rate and regular rhythm  Heart sounds: Normal heart sounds  No murmur heard    Pulmonary:      Effort: Pulmonary effort is normal  No respiratory distress  Breath sounds: Normal breath sounds  Chest:   Breasts:      Right: No axillary adenopathy or supraclavicular adenopathy  Left: No axillary adenopathy or supraclavicular adenopathy  Abdominal:      Palpations: Abdomen is soft  Tenderness: There is no abdominal tenderness  Musculoskeletal:         General: No tenderness  Normal range of motion  Cervical back: Normal range of motion and neck supple  Right lower leg: No edema  Left lower leg: No edema  Lymphadenopathy:      Cervical: No cervical adenopathy  Upper Body:      Right upper body: No supraclavicular or axillary adenopathy  Left upper body: No supraclavicular or axillary adenopathy  Skin:     General: Skin is warm and dry  Neurological:      Mental Status: She is alert and oriented to person, place, and time  Cranial Nerves: No cranial nerve deficit  Psychiatric:         Mood and Affect: Mood normal          Behavior: Behavior normal        Labs:  Lab Results   Component Value Date    WBC 7 41 09/16/2019    HGB 13 6 09/16/2019    HCT 41 8 09/16/2019    MCV 96 09/16/2019     09/16/2019     Lab Results   Component Value Date    K 3 9 09/16/2019     09/16/2019    CO2 27 09/16/2019    BUN 8 09/16/2019    CREATININE 0 70 09/16/2019    GLUF 93 09/16/2019    CALCIUM 9 3 09/16/2019    AST 16 09/16/2019    ALT 21 09/16/2019    ALKPHOS 48 09/16/2019    EGFR 93 09/16/2019     Patient voiced understanding and agreement in the above discussion  Aware to contact our office with questions/symptoms in the interim  This note has been generated by voice recognition software system  Therefore, there may be spelling, grammar, and or syntax errors  Please contact if questions arise

## 2023-05-02 ENCOUNTER — OFFICE VISIT (OUTPATIENT)
Dept: HEMATOLOGY ONCOLOGY | Facility: CLINIC | Age: 68
End: 2023-05-02

## 2023-05-02 VITALS
HEIGHT: 66 IN | RESPIRATION RATE: 18 BRPM | SYSTOLIC BLOOD PRESSURE: 120 MMHG | DIASTOLIC BLOOD PRESSURE: 68 MMHG | OXYGEN SATURATION: 97 % | BODY MASS INDEX: 28.77 KG/M2 | TEMPERATURE: 98.3 F | HEART RATE: 73 BPM | WEIGHT: 178.99 LBS

## 2023-05-02 DIAGNOSIS — Z79.811 USE OF ANASTROZOLE (ARIMIDEX): ICD-10-CM

## 2023-05-02 DIAGNOSIS — M85.80 OSTEOPENIA, UNSPECIFIED LOCATION: ICD-10-CM

## 2023-05-02 DIAGNOSIS — D05.12 DUCTAL CARCINOMA IN SITU (DCIS) OF LEFT BREAST: Primary | ICD-10-CM

## 2023-05-02 RX ORDER — MONTELUKAST SODIUM 10 MG/1
10 TABLET ORAL EVERY MORNING
COMMUNITY
Start: 2023-04-24

## 2023-05-02 RX ORDER — ANASTROZOLE 1 MG/1
1 TABLET ORAL DAILY
Qty: 90 TABLET | Refills: 3 | Status: SHIPPED | OUTPATIENT
Start: 2023-05-02

## 2023-05-02 RX ORDER — LORAZEPAM 1 MG/1
1 TABLET ORAL DAILY PRN
COMMUNITY
Start: 2023-04-18

## 2023-05-02 RX ORDER — ROSUVASTATIN CALCIUM 20 MG/1
20 TABLET, COATED ORAL EVERY MORNING
COMMUNITY
Start: 2023-04-18

## 2023-05-02 NOTE — PROGRESS NOTES
Hematology/Oncology Outpatient Follow-up  Ephraim Diaz 78 y o  female 1955 [id number]    Date:  5/2/2023      Assessment and Plan:    1  Ductal carcinoma in situ (DCIS) of left breast    Ms Beltran is a 81-year-old female with left breast DCIS, diagnosed in 2019  Patient underwent lumpectomy in September 2019  She has been on hormonal therapy with anastrozole 1 mg daily since October 2019  Patient does have occasional hot flashes; arthralgias have gotten more noticeable, although manageable  Patient was recommended to continue anastrozole for total 5 years of therapy (end date will be approximately around October 2024)  Patient to continue getting annual mammograms (most recent one was from May 2022, she is scheduled to get her next mammogram on 5/24/2023)  - anastrozole (ARIMIDEX) 1 mg tablet; Take 1 tablet (1 mg total) by mouth daily  Dispense: 90 tablet; Refill: 3  - CBC and differential; Future  - Comprehensive metabolic panel; Future    2  Osteopenia    Patient was noted to have osteopenia from DEXA scan in December 2020  She has been getting DEXA scans every 2 years with most recent one being in January 2023, which showed T score of -1 5 in lumbar spine and -2 0 in left femoral neck  T score has been gradually worsening  This could potentially be related to medication side effect from anastrozole  Patient reports compliance with her calcium and vitamin D supplements  We discussed with the patient about potentially starting denosumab, however she would like to avoid taking any medications at this time  She would like to try strengthening exercises with weight lifting  Based on her next DXA scan results (due around Jan 2025), she will relay whether she would like to try further medical therapy  Compliance with vit D and calcium supplementation was emphasized  - Vitamin D 25 hydroxy;  Future    HPI:  Oncology History   Ductal carcinoma in situ (DCIS) of left breast   9/9/2019 Initial Diagnosis    Ductal carcinoma in situ (DCIS) of left breast     9/9/2019 -  Cancer Staged    Staging form: Breast, AJCC 8th Edition  - Clinical: Stage 0 (cTis (DCIS), cN0, cM0, G1, ER: Unknown, MA: Unknown) - Signed by Tonio Mullins MD on 9/9/2019  Laterality: Left  Method of lymph node assessment: Clinical  Histologic grading system: 3 grade system       10/15/2019 -  Cancer Staged    Staging form: Breast, AJCC 8th Edition  - Pathologic: Stage Unknown (pTis (DCIS), pNX, cM0, G2, ER+, MA+) - Signed by Tonio Mullins MD on 10/15/2019  Laterality: Left  Histologic grading system: 3 grade system         78-year-old female presents for follow-up regarding history of left-sided breast cancer  This was diagnosed in September 2019  She underwent lumpectomy for hormone receptor positive, grade 1, 3 mm DCIS the left breast   Since October 2019 she has been on adjuvant Arimidex  She has been tolerating this well  She has chronic mild arthritis that she does not feel is worse with Arimidex  She had hot flashes at the beginning of taking this medication but does not have at this time      She takes calcium and vitamin-D  DEXA scan is up-to-date  Interval history:     Patient presented to the office for a routine follow-up visit today  Denied any acute complaints  She has been tolerating anastrozole fairly okay, although she continues to get intermittent episodes of hot flashes and has noticed some mild worsening of her arthralgias  She had her most recent DEXA scan in January 2023, which reports findings consistent with osteopenia in her lumbar spine and left femoral neck  She reports compliance with her calcium and vitamin D supplementation  Most recent mammogram was in May 2022, which showed benign findings in both breast   She is scheduled to get her next mammogram on 5/24/2023  ROS: Review of Systems   Constitutional: Negative for appetite change, chills, fatigue and fever     HENT: Negative for dental problem, "facial swelling, mouth sores, trouble swallowing and voice change  Respiratory: Negative for cough and shortness of breath  Cardiovascular: Negative for chest pain, palpitations and leg swelling  Gastrointestinal: Negative for abdominal pain, constipation, diarrhea, nausea and vomiting  Endocrine: Positive for heat intolerance  Intermittent episodes of hot flashes    Genitourinary: Negative for difficulty urinating, dysuria and hematuria  Musculoskeletal: Positive for arthralgias  Negative for back pain  Skin: Negative  Negative for pallor, rash and wound  Neurological: Negative for dizziness, weakness, light-headedness, numbness and headaches  Hematological: Negative  Negative for adenopathy  Does not bruise/bleed easily  Psychiatric/Behavioral: Negative  Negative for agitation, behavioral problems, confusion and decreased concentration       Past Medical History:   Diagnosis Date    Anxiety     \"time to time\"    Arthritis     Dental crowns present     Eczema     Environmental and seasonal allergies     Food allergy     Hypertension     Irritable bowel syndrome     Lactose intolerance     Motion sickness     Osteoarthritis     mainly hands/feet/right hip    Osteopenia     Vaginal Pap smear 10/06/2017    WNL    Wears contact lenses     Wears glasses        Past Surgical History:   Procedure Laterality Date    BREAST BIOPSY Left 08/30/2019    us bx- neg    BREAST BIOPSY Left 08/30/2019    MRI bx- dcis    BREAST BIOPSY Right 08/30/2019    MRI bx- neg    BREAST LUMPECTOMY Left 09/27/2019    Procedure: LUMPECTOMY BREAST NEEDLE LOCALIZED; 1200 NEEDLE LOC;  Surgeon: Romy Monsivais MD;  [facility];  Service: Surgical Oncology    COLONOSCOPY      CYSTOSCOPY N/A 07/01/2019    Procedure: Diana Rosales;  Surgeon: Hugh Ross MD;  [facility];  Service: UroGynecology           ENDOMETRIAL ABLATION      HAND SURGERY Left     cyst removed from palm    LIVER " "BIOPSY      x2 early 19's    MAMMO NEEDLE LOCALIZATION LEFT (ALL INC) Left 09/27/2019    MRI BREAST BIOPSY LEFT (ALL INCLUSIVE) Left 08/30/2019    MRI BREAST BIOPSY RIGHT (ALL INCLUSIVE) Right 08/30/2019    OH POST COLPORRHAPHY RECTOCELE W/WO PERINEORRHAPHY N/A 07/01/2019    Procedure: POSTERIOR COLPORRHAPHY;  Surgeon: Isaac Hoskins MD;  [facility];  Service: UroGynecology           PUBOVAGINAL SLING N/A 07/01/2019    Procedure: SINGLE INCISION SLING;  Surgeon: Isaac Hoskins MD;  [facility];  Service: UroGynecology           VAGINAL HYSTERECTOMY  01/01/2010    BSO AND REPAIR   UTEROVAGINAL PROLAPSE    WISDOM TOOTH EXTRACTION      WRIST SURGERY      4/2022       Social History     Socioeconomic History    Marital status: /Civil Union     Spouse name: None    Number of children: None    Years of education: None    Highest education level: None   Occupational History    None   Tobacco Use    Smoking status: Never    Smokeless tobacco: Never   Vaping Use    Vaping Use: Never used   Substance and Sexual Activity    Alcohol use:  Yes     Alcohol/week: 1 0 standard drink     Types: 1 Standard drinks or equivalent per week     Comment: \"very infrequent\"    Drug use: No    Sexual activity: Yes   Other Topics Concern    None   Social History Narrative    None     Social Determinants of Health     Financial Resource Strain: Not on file   Food Insecurity: Not on file   Transportation Needs: Not on file   Physical Activity: Not on file   Stress: Not on file   Social Connections: Not on file   Intimate Partner Violence: Not on file   Housing Stability: Not on file       Family History   Problem Relation Age of Onset    No Known Problems Mother     Lung cancer Father     No Known Problems Daughter     No Known Problems Daughter     No Known Problems Maternal Grandmother     No Known Problems Maternal Grandfather     No Known Problems Paternal Grandmother     No Known " "Problems Paternal Grandfather     Lung cancer Maternal Aunt     Lung cancer Family     Diabetes Family        Allergies   Allergen Reactions    Meperidine Nausea Only, Anxiety and Palpitations    Pineapple - Food Allergy Tongue Swelling    Shellfish-Derived Products - Food Allergy Throat Swelling     Happens sometimes with combination of seafood    Strawberry C [Ascorbate - Food Allergy]      Strawberries sometimes tongue swells    Lactose - Food Allergy GI Intolerance     intolerance    Pollen Extract Itching and Nasal Congestion     Receives allergy shots also allergy to dust mites/insects    Tree Extract Allergic Rhinitis         Current Outpatient Medications:     anastrozole (ARIMIDEX) 1 mg tablet, Take 1 tablet (1 mg total) by mouth daily, Disp: 90 tablet, Rfl: 3    CALCIUM PO, Take by mouth, Disp: , Rfl:     Cholecalciferol (VITAMIN D3) 5000 units CAPS, Take by mouth as needed , Disp: , Rfl:     fexofenadine (ALLEGRA) 180 MG tablet, daily as needed , Disp: , Rfl:     fluticasone (FLONASE) 50 mcg/act nasal spray, , Disp: , Rfl:     hydrocortisone 0 5 % cream, Apply topically 2 (two) times a day as needed, Disp: , Rfl:     lisinopril (ZESTRIL) 10 mg tablet, 10 mg daily , Disp: , Rfl:     LORazepam (ATIVAN) 1 mg tablet, Take 1 mg by mouth daily as needed, Disp: , Rfl:     metoprolol tartrate (LOPRESSOR) 50 mg tablet, daily , Disp: , Rfl:     montelukast (SINGULAIR) 10 mg tablet, Take 10 mg by mouth every morning, Disp: , Rfl:     rosuvastatin (CRESTOR) 20 MG tablet, Take 20 mg by mouth every morning, Disp: , Rfl:     Physical Exam:  /68 (BP Location: Left arm, Patient Position: Sitting, Cuff Size: Adult)   Pulse 73   Temp 98 3 °F (36 8 °C) (Temporal)   Resp 18   Ht 5' 5 5\" (1 664 m)   Wt 81 2 kg (178 lb 15 9 oz)   SpO2 97%   BMI 29 33 kg/m²     Physical Exam  Vitals reviewed  Constitutional:       General: She is not in acute distress  Appearance: She is well-developed   " She is not ill-appearing  HENT:      Head: Normocephalic and atraumatic  Mouth/Throat:      Mouth: Mucous membranes are moist       Pharynx: No oropharyngeal exudate  Eyes:      General: No scleral icterus  Conjunctiva/sclera: Conjunctivae normal    Cardiovascular:      Rate and Rhythm: Normal rate and regular rhythm  Heart sounds: Normal heart sounds  No murmur heard  Pulmonary:      Effort: Pulmonary effort is normal  No respiratory distress  Breath sounds: Normal breath sounds  Abdominal:      Palpations: Abdomen is soft  Tenderness: There is no abdominal tenderness  Musculoskeletal:         General: No tenderness  Normal range of motion  Cervical back: Normal range of motion and neck supple  No rigidity  Right lower leg: No edema  Left lower leg: No edema  Lymphadenopathy:      Cervical: No cervical adenopathy  Upper Body:      Right upper body: No supraclavicular or axillary adenopathy  Left upper body: No supraclavicular or axillary adenopathy  Skin:     General: Skin is warm and dry  Neurological:      Mental Status: She is alert and oriented to person, place, and time  Cranial Nerves: No cranial nerve deficit  Motor: No weakness     Psychiatric:         Mood and Affect: Mood normal          Behavior: Behavior normal        Labs:  Lab Results   Component Value Date    WBC 7 41 09/16/2019    HGB 13 6 09/16/2019    HCT 41 8 09/16/2019    MCV 96 09/16/2019     09/16/2019     Lab Results   Component Value Date    K 3 9 09/16/2019     09/16/2019    CO2 27 09/16/2019    BUN 8 09/16/2019    CREATININE 0 70 09/16/2019    GLUF 93 09/16/2019    CALCIUM 9 3 09/16/2019    AST 16 09/16/2019    ALT 21 09/16/2019    ALKPHOS 48 09/16/2019    EGFR 93 09/16/2019

## 2023-05-24 ENCOUNTER — HOSPITAL ENCOUNTER (OUTPATIENT)
Dept: MAMMOGRAPHY | Facility: CLINIC | Age: 68
Discharge: HOME | End: 2023-05-24

## 2023-05-24 ENCOUNTER — OFFICE VISIT (OUTPATIENT)
Dept: SURGICAL ONCOLOGY | Facility: CLINIC | Age: 68
End: 2023-05-24

## 2023-05-24 VITALS
HEART RATE: 82 BPM | OXYGEN SATURATION: 98 % | TEMPERATURE: 98.9 F | DIASTOLIC BLOOD PRESSURE: 80 MMHG | WEIGHT: 179 LBS | BODY MASS INDEX: 28.77 KG/M2 | HEIGHT: 66 IN | SYSTOLIC BLOOD PRESSURE: 128 MMHG | RESPIRATION RATE: 16 BRPM

## 2023-05-24 DIAGNOSIS — D05.12 DUCTAL CARCINOMA IN SITU (DCIS) OF LEFT BREAST: Primary | ICD-10-CM

## 2023-05-24 DIAGNOSIS — Z79.811 USE OF ANASTROZOLE: ICD-10-CM

## 2023-05-24 DIAGNOSIS — D05.12 DUCTAL CARCINOMA IN SITU (DCIS) OF LEFT BREAST: ICD-10-CM

## 2023-05-24 NOTE — PROGRESS NOTES
Surgical Oncology Follow Up       [address] [facility] [place] SURGICAL ONCOLOGY [facility] [address]    Franny Cormier  1955  [id number]  [facility] SURGICAL ONCOLOGY [facility] [address]    Chief Complaint   Patient presents with   • Follow-up       Assessment/Plan   Diagnoses and all orders for this visit:    Ductal carcinoma in situ (DCIS) of left breast  -     Mammo diagnostic bilateral w 3d & cad; Future    Use of anastrozole        Advance Care Planning/Advance Directives:  Discussed disease status, cancer treatment plans and/or cancer treatment goals with the patient  Oncology History:    Oncology History   Ductal carcinoma in situ (DCIS) of left breast   9/9/2019 Initial Diagnosis    Ductal carcinoma in situ (DCIS) of left breast     9/9/2019 -  Cancer Staged    Staging form: Breast, AJCC 8th Edition  - Clinical: Stage 0 (cTis (DCIS), cN0, cM0, G1, ER: Unknown, TX: Unknown) - Signed by Johnny Graham MD on 9/9/2019  Laterality: Left  Method of lymph node assessment: Clinical  Histologic grading system: 3 grade system       10/15/2019 -  Cancer Staged    Staging form: Breast, AJCC 8th Edition  - Pathologic: Stage Unknown (pTis (DCIS), pNX, cM0, G2, ER+, TX+) - Signed by Johnny Graham MD on 10/15/2019  Laterality: Left  Histologic grading system: 3 grade system           History of Present Illness: Breast cancer follow-up, no breast referable concerns, other issues as noted in ROS which she attributes to her anastrozole  -Interval History: Benign mammogram    Review of Systems:  Review of Systems   Constitutional: Negative for appetite change and fever  Hot flashes   Eyes: Negative  Respiratory: Negative for shortness of breath  Cardiovascular: Negative  Gastrointestinal: Negative  Endocrine: Negative  Genitourinary: Negative  Musculoskeletal: Positive for arthralgias  "Negative for myalgias  Skin: Negative  Allergic/Immunologic: Negative  Neurological: Negative  Hematological: Negative  Negative for adenopathy  Does not bruise/bleed easily  Psychiatric/Behavioral: Negative          Patient Active Problem List   Diagnosis   • Ductal carcinoma in situ (DCIS) of left breast   • Use of anastrozole   • Gilbert's syndrome   • Essential hypertension     Past Medical History:   Diagnosis Date   • Anxiety     \"time to time\"   • Arthritis    • Dental crowns present    • Eczema    • Environmental and seasonal allergies    • Food allergy    • Hypertension    • Irritable bowel syndrome    • Lactose intolerance    • Motion sickness    • Osteoarthritis     mainly hands/feet/right hip   • Osteopenia    • Vaginal Pap smear 10/06/2017    WNL   • Wears contact lenses    • Wears glasses      Past Surgical History:   Procedure Laterality Date   • BREAST BIOPSY Left 08/30/2019    us bx- neg   • BREAST BIOPSY Left 08/30/2019    MRI bx- dcis   • BREAST BIOPSY Right 08/30/2019    MRI bx- neg   • BREAST LUMPECTOMY Left 09/27/2019    Procedure: LUMPECTOMY BREAST NEEDLE LOCALIZED; 1200 NEEDLE LOC;  Surgeon: Simran Gardner MD;  [facility];  Service: Surgical Oncology   • COLONOSCOPY     • CYSTOSCOPY N/A 07/01/2019    Procedure: Angel Perez;  Surgeon: Shaquille Marquez MD;  [facility];  Service: UroGynecology          • ENDOMETRIAL ABLATION     • HAND SURGERY Left     cyst removed from palm   • LIVER BIOPSY      x2 early 20's   • MAMMO NEEDLE LOCALIZATION LEFT (ALL INC) Left 09/27/2019   • MRI BREAST BIOPSY LEFT (ALL INCLUSIVE) Left 08/30/2019   • MRI BREAST BIOPSY RIGHT (ALL INCLUSIVE) Right 08/30/2019   • [place] POST COLPORRHAPHY RECTOCELE W/WO PERINEORRHAPHY N/A 07/01/2019    Procedure: POSTERIOR COLPORRHAPHY;  Surgeon: Shaquille Marquez MD;  [facility];  Service: UroGynecology          • PUBOVAGINAL SLING N/A 07/01/2019    Procedure: SINGLE INCISION SLING;  Surgeon: " "Tram Asher MD;  Location: [facility] OR;  Service: UroGynecology          • VAGINAL HYSTERECTOMY  01/01/2010    BSO AND REPAIR   UTEROVAGINAL PROLAPSE   • WISDOM TOOTH EXTRACTION     • WRIST SURGERY      4/2022     Family History   Problem Relation Age of Onset   • No Known Problems Mother    • Lung cancer Father    • No Known Problems Daughter    • No Known Problems Daughter    • No Known Problems Maternal Grandmother    • No Known Problems Maternal Grandfather    • No Known Problems Paternal Grandmother    • No Known Problems Paternal Grandfather    • Lung cancer Maternal Aunt    • Lung cancer Family    • Diabetes Family      Social History     Socioeconomic History   • Marital status: /Civil Union     Spouse name: Not on file   • Number of children: Not on file   • Years of education: Not on file   • Highest education level: Not on file   Occupational History   • Not on file   Tobacco Use   • Smoking status: Never   • Smokeless tobacco: Never   Vaping Use   • Vaping Use: Never used   Substance and Sexual Activity   • Alcohol use:  Yes     Alcohol/week: 1 0 standard drink of alcohol     Types: 1 Standard drinks or equivalent per week     Comment: \"very infrequent\"   • Drug use: No   • Sexual activity: Yes   Other Topics Concern   • Not on file   Social History Narrative   • Not on file     Social Determinants of Health     Financial Resource Strain: Not on file   Food Insecurity: Not on file   Transportation Needs: Not on file   Physical Activity: Not on file   Stress: Not on file   Social Connections: Not on file   Intimate Partner Violence: Not on file   Housing Stability: Not on file       Current Outpatient Medications:   •  anastrozole (ARIMIDEX) 1 mg tablet, Take 1 tablet (1 mg total) by mouth daily, Disp: 90 tablet, Rfl: 3  •  CALCIUM PO, Take by mouth, Disp: , Rfl:   •  Cholecalciferol (VITAMIN D3) 5000 units CAPS, Take by mouth as needed , Disp: , Rfl:   •  fexofenadine (ALLEGRA) 180 MG tablet, " daily as needed , Disp: , Rfl:   •  fluticasone (FLONASE) 50 mcg/act nasal spray, , Disp: , Rfl:   •  hydrocortisone 0 5 % cream, Apply topically 2 (two) times a day as needed, Disp: , Rfl:   •  lisinopril (ZESTRIL) 10 mg tablet, 10 mg daily , Disp: , Rfl:   •  LORazepam (ATIVAN) 1 mg tablet, Take 1 mg by mouth daily as needed, Disp: , Rfl:   •  metoprolol tartrate (LOPRESSOR) 50 mg tablet, daily , Disp: , Rfl:   •  montelukast (SINGULAIR) 10 mg tablet, Take 10 mg by mouth every morning, Disp: , Rfl:   •  rosuvastatin (CRESTOR) 20 MG tablet, Take 20 mg by mouth every morning, Disp: , Rfl:   Allergies   Allergen Reactions   • Meperidine Nausea Only, Anxiety and Palpitations   • Pineapple - Food Allergy Tongue Swelling   • Shellfish-Derived Products - Food Allergy Throat Swelling     Happens sometimes with combination of seafood   • Strawberry C [Ascorbate - Food Allergy]      Strawberries sometimes tongue swells   • Lactose - Food Allergy GI Intolerance     intolerance   • Pollen Extract Itching and Nasal Congestion     Receives allergy shots also allergy to dust mites/insects   • Tree Extract Allergic Rhinitis       The following portions of the patient's history were reviewed and updated as appropriate: allergies, current medications, past family history, past medical history, past social history, past surgical history and problem list         Vitals:    05/24/23 1355   BP: 128/80   Pulse: 82   Resp: 16   Temp: 98 9 °F (37 2 °C)   SpO2: 98%       Physical Exam  Constitutional:       General: She is not in acute distress  Appearance: Normal appearance  She is well-developed  HENT:      Head: Normocephalic and atraumatic  Cardiovascular:      Heart sounds: Normal heart sounds  Pulmonary:      Breath sounds: Normal breath sounds  Chest:   Breasts:     Right: No inverted nipple, mass, nipple discharge, skin change or tenderness  Left: Skin change ( lumpectomy scar) present   No inverted nipple, mass, nipple discharge or tenderness  Abdominal:      Palpations: Abdomen is soft  Lymphadenopathy:      Upper Body:      Right upper body: No supraclavicular, axillary or pectoral adenopathy  Left upper body: No supraclavicular, axillary or pectoral adenopathy  Neurological:      Mental Status: She is alert and oriented to person, place, and time  Psychiatric:         Mood and Affect: Mood normal            Results:  Labs:      Imaging  5/24/2023 bilateral 3D diagnostic mammogram was benign BI-RADS 2 with a density of 2    I reviewed the above imaging data  Discussion/Summary: 52-year-old female status post left breast conservation for DCIS  She did not have any radiation therapy secondary to low decision RT  She is currently on anastrozole  There is no evidence of disease based on exam today  Her mammogram done earlier today is also benign  I will therefore see her again in 1 year at the time of her mammogram or sooner should the need arise

## 2023-08-21 ENCOUNTER — TELEPHONE (OUTPATIENT)
Dept: HEMATOLOGY ONCOLOGY | Facility: CLINIC | Age: 68
End: 2023-08-21

## 2023-08-21 NOTE — TELEPHONE ENCOUNTER
Appointment Change  Cancel, Reschedule, Change to Virtual      Who are you speaking with? self   If it is not the patient, are they listed on an active communication consent form? self   Which provider is the appointment scheduled with? [facility]   When is the appointment scheduled? Please list date and time 11/7   At which location is the appointment scheduled to take place? [facility]   Was the appointment rescheduled or changed from an in person visit to a virtual visit? If so, please list the details of the change. Yes, 11/21 11am [facility]   What is the reason for the appointment change? travel   Was [facility] transport scheduled for this visit? no   Does [facility] transport need to be scheduled for the new visit (if applicable) no   Does the patient need an infusion appointment rescheduled? no   Does the patient have an infusion appointment scheduled? If so, when? no   Is the patient undergoing chemotherapy? no   Was the no-show policy reviewed for appointments being changed with less then 24 hours of notice?  yes

## 2023-11-21 ENCOUNTER — OFFICE VISIT (OUTPATIENT)
Dept: HEMATOLOGY ONCOLOGY | Facility: CLINIC | Age: 68
End: 2023-11-21
Payer: MEDICARE

## 2023-11-21 VITALS
BODY MASS INDEX: 25.55 KG/M2 | DIASTOLIC BLOOD PRESSURE: 84 MMHG | RESPIRATION RATE: 18 BRPM | TEMPERATURE: 97.2 F | HEART RATE: 72 BPM | OXYGEN SATURATION: 97 % | WEIGHT: 159 LBS | SYSTOLIC BLOOD PRESSURE: 132 MMHG | HEIGHT: 66 IN

## 2023-11-21 DIAGNOSIS — D05.12 DUCTAL CARCINOMA IN SITU (DCIS) OF LEFT BREAST: ICD-10-CM

## 2023-11-21 DIAGNOSIS — Z79.811 USE OF ANASTROZOLE: Primary | ICD-10-CM

## 2023-11-21 PROCEDURE — 99214 OFFICE O/P EST MOD 30 MIN: CPT | Performed by: PHYSICIAN ASSISTANT

## 2023-11-21 RX ORDER — METOPROLOL SUCCINATE 50 MG/1
TABLET, EXTENDED RELEASE ORAL
COMMUNITY
Start: 2023-11-19

## 2023-11-21 RX ORDER — AMOXICILLIN AND CLAVULANATE POTASSIUM 875; 125 MG/1; MG/1
TABLET, FILM COATED ORAL
COMMUNITY
Start: 2023-11-20

## 2023-11-21 NOTE — PROGRESS NOTES
Hematology/Oncology Outpatient Follow-up  Lubna Hinton 80 y.o. female 1955 [id number]    Date:  11/21/2023      Assessment and Plan:    2. Ductal carcinoma in situ (DCIS) of left breast 1. Use of anastrozole  78-year-old female presents for follow-up regarding history of breast cancer diagnosed in 2019 on adjuvant Arimidex. She is tolerating well. She will continue on this through 2024. DEXA scan is up-to-date on [facility]. She is to advise continuation of calcium and vitamin D. Follow-up labs in 1 year. - CBC and differential; Future  - Comprehensive metabolic panel; Future       HPI:  Oncology History   Ductal carcinoma in situ (DCIS) of left breast   9/9/2019 Initial Diagnosis    Ductal carcinoma in situ (DCIS) of left breast     9/9/2019 -  Cancer Staged    Staging form: Breast, AJCC 8th Edition  - Clinical: Stage 0 (cTis (DCIS), cN0, cM0, G1, ER: Unknown, IA: Unknown) - Signed by Sara Pineda MD on 9/9/2019  Laterality: Left  Method of lymph node assessment: Clinical  Histologic grading system: 3 grade system       10/15/2019 -  Cancer Staged    Staging form: Breast, AJCC 8th Edition  - Pathologic: Stage Unknown (pTis (DCIS), pNX, cM0, G2, ER+, IA+) - Signed by Sara Pineda MD on 10/15/2019  Laterality: Left  Histologic grading system: 3 grade system         78-year-old female presents for follow-up regarding history of left-sided breast cancer. This was diagnosed in September 2019. She underwent lumpectomy for hormone receptor positive, grade 1, 3 mm DCIS the left breast.  Since October 2019 she has been on adjuvant Arimidex. She has been tolerating this well. She has chronic mild arthritis that she does not feel is worse with Arimidex. She had hot flashes at the beginning of taking this medication but does not have at this time. She takes calcium and vitamin-D. DEXA scan is up-to-date.     Interval history:    ROS: Review of Systems   Constitutional:  Negative for appetite change, chills, fatigue, fever and unexpected weight change. Respiratory:  Negative for cough and shortness of breath. Cardiovascular:  Negative for chest pain, palpitations and leg swelling. Gastrointestinal:  Negative for abdominal pain, constipation, diarrhea, nausea and vomiting. Genitourinary:  Negative for difficulty urinating, dysuria and hematuria. Musculoskeletal:  Positive for arthralgias. Skin: Negative. Neurological:  Negative for dizziness, weakness, light-headedness, numbness and headaches. Hematological: Negative. Psychiatric/Behavioral: Negative.        Past Medical History:   Diagnosis Date    Anxiety     "time to time"    Arthritis     Dental crowns present     Eczema     Environmental and seasonal allergies     Food allergy     Hypertension     Irritable bowel syndrome     Lactose intolerance     Motion sickness     Osteoarthritis     mainly hands/feet/right hip    Osteopenia     Vaginal Pap smear 10/06/2017    WNL    Wears contact lenses     Wears glasses        Past Surgical History:   Procedure Laterality Date    BREAST BIOPSY Left 08/30/2019    us bx- neg    BREAST BIOPSY Left 08/30/2019    MRI bx- dcis    BREAST BIOPSY Right 08/30/2019    MRI bx- neg    BREAST LUMPECTOMY Left 09/27/2019    Procedure: LUMPECTOMY BREAST NEEDLE LOCALIZED; 1200 NEEDLE LOC;  Surgeon: Jose A Zhong MD;  [facility];  Service: Surgical Oncology    COLONOSCOPY      CYSTOSCOPY N/A 07/01/2019    Procedure: Annette Garcia;  Surgeon: Jim David MD;  [facility];  Service: UroGynecology           ENDOMETRIAL ABLATION      HAND SURGERY Left     cyst removed from palm    LIVER BIOPSY      x2 early 19's    MAMMO NEEDLE LOCALIZATION LEFT (ALL INC) Left 09/27/2019    MRI BREAST BIOPSY LEFT (ALL INCLUSIVE) Left 08/30/2019    MRI BREAST BIOPSY RIGHT (ALL INCLUSIVE) Right 08/30/2019    [place] POST COLPORRHAPHY RECTOCELE W/WO PERINEORRHAPHY N/A 07/01/2019    Procedure: POSTERIOR COLPORRHAPHY;  Surgeon: Mohit Mckinley MD;  [facility];  Service: UroGynecology           PUBOVAGINAL SLING N/A 07/01/2019    Procedure: SINGLE INCISION SLING;  Surgeon: Mohit Mckinley MD;  [facility];  Service: UroGynecology           VAGINAL HYSTERECTOMY  01/01/2010    BSO AND REPAIR   UTEROVAGINAL PROLAPSE    WISDOM TOOTH EXTRACTION      WRIST SURGERY      4/2022       Social History     Socioeconomic History    Marital status: /Civil Union     Spouse name: Not on file    Number of children: Not on file    Years of education: Not on file    Highest education level: Not on file   Occupational History    Not on file   Tobacco Use    Smoking status: Never    Smokeless tobacco: Never   Vaping Use    Vaping Use: Never used   Substance and Sexual Activity    Alcohol use:  Yes     Alcohol/week: 1.0 standard drink of alcohol     Types: 1 Standard drinks or equivalent per week     Comment: "very infrequent"    Drug use: No    Sexual activity: Yes   Other Topics Concern    Not on file   Social History Narrative    Not on file     Social Determinants of Health     Financial Resource Strain: Not on file   Food Insecurity: Not on file   Transportation Needs: Not on file   Physical Activity: Not on file   Stress: Not on file   Social Connections: Not on file   Intimate Partner Violence: Not on file   Housing Stability: Not on file       Family History   Problem Relation Age of Onset    No Known Problems Mother     Lung cancer Father     No Known Problems Daughter     No Known Problems Daughter     No Known Problems Maternal Grandmother     No Known Problems Maternal Grandfather     No Known Problems Paternal Grandmother     No Known Problems Paternal Grandfather     Lung cancer Maternal Aunt     Lung cancer Family     Diabetes Family      Allergies   Allergen Reactions    Meperidine Nausea Only, Anxiety and Palpitations    Pineapple - Food Allergy Tongue Swelling    Shellfish-Derived Products - Food Allergy Throat Swelling     Happens sometimes with combination of seafood    Strawberry C [Ascorbate - Food Allergy]      Strawberries sometimes tongue swells    Lactose - Food Allergy GI Intolerance     intolerance    Pollen Extract Itching and Nasal Congestion     Receives allergy shots also allergy to dust mites/insects    Tree Extract Allergic Rhinitis         Current Outpatient Medications:     amoxicillin-clavulanate (AUGMENTIN) 875-125 mg per tablet, , Disp: , Rfl:     anastrozole (ARIMIDEX) 1 mg tablet, Take 1 tablet (1 mg total) by mouth daily, Disp: 90 tablet, Rfl: 3    CALCIUM PO, Take by mouth, Disp: , Rfl:     Calcium-Vitamin D-Vitamin K (VIACTIV CALCIUM PLUS D PO), , Disp: , Rfl:     Cholecalciferol (VITAMIN D3) 5000 units CAPS, Take 1,000 capsules by mouth as needed 1,000 daily not 5,000 taking once a day., Disp: , Rfl:     fexofenadine (ALLEGRA) 180 MG tablet, daily as needed , Disp: , Rfl:     fluticasone (FLONASE) 50 mcg/act nasal spray, , Disp: , Rfl:     hydrocortisone 0.5 % cream, Apply topically 2 (two) times a day as needed, Disp: , Rfl:     lisinopril (ZESTRIL) 10 mg tablet, 10 mg daily , Disp: , Rfl:     LORazepam (ATIVAN) 1 mg tablet, Take 1 mg by mouth daily as needed, Disp: , Rfl:     metoprolol succinate (TOPROL-XL) 50 mg 24 hr tablet, , Disp: , Rfl:     metoprolol tartrate (LOPRESSOR) 50 mg tablet, daily , Disp: , Rfl:     montelukast (SINGULAIR) 10 mg tablet, Take 10 mg by mouth every morning, Disp: , Rfl:     rosuvastatin (CRESTOR) 20 MG tablet, Take 20 mg by mouth every morning, Disp: , Rfl:     Physical Exam:  /84 (BP Location: Left arm)   Pulse 72   Temp (!) 97.2 °F (36.2 °C) (Tympanic)   Resp 18   Ht 5' 5.5" (1.664 m)   Wt 72.1 kg (159 lb)   SpO2 97%   BMI 26.06 kg/m²     Physical Exam  Vitals reviewed. Constitutional:       General: She is not in acute distress. Appearance: She is well-developed. She is not ill-appearing. HENT:      Head: Normocephalic and atraumatic. Eyes:      General: No scleral icterus. Conjunctiva/sclera: Conjunctivae normal.   Cardiovascular:      Rate and Rhythm: Normal rate and regular rhythm. Heart sounds: Normal heart sounds. No murmur heard. Pulmonary:      Effort: Pulmonary effort is normal. No respiratory distress. Breath sounds: Normal breath sounds. Abdominal:      Palpations: Abdomen is soft. Tenderness: There is no abdominal tenderness. Musculoskeletal:         General: No tenderness. Normal range of motion. Cervical back: Normal range of motion and neck supple. Right lower leg: No edema. Left lower leg: No edema. Lymphadenopathy:      Cervical: No cervical adenopathy. Skin:     General: Skin is warm and dry. Neurological:      Mental Status: She is alert and oriented to person, place, and time. Cranial Nerves: No cranial nerve deficit. Psychiatric:         Mood and Affect: Mood normal.         Behavior: Behavior normal.     Labs:  Lab Results   Component Value Date    WBC 7.41 09/16/2019    HGB 13.6 09/16/2019    HCT 41.8 09/16/2019    MCV 96 09/16/2019     09/16/2019     I have spent 20 minutes with Patient  today in which greater than 50% of this time was spent in counseling/coordination of care regarding Diagnostic results, Risks and benefits of tx options, Instructions for management, Impressions, Documenting in the medical record, Reviewing / ordering tests, medicine, procedures  , and Obtaining or reviewing history  . Patient voiced understanding and agreement in the above discussion. Aware to contact our office with questions/symptoms in the interim. This note has been generated by voice recognition software system. Therefore, there may be spelling, grammar, and or syntax errors. Please contact if questions arise.

## 2024-05-29 ENCOUNTER — OFFICE VISIT (OUTPATIENT)
Dept: HEMATOLOGY ONCOLOGY | Facility: CLINIC | Age: 69
End: 2024-05-29
Payer: MEDICARE

## 2024-05-29 ENCOUNTER — OFFICE VISIT (OUTPATIENT)
Dept: SURGICAL ONCOLOGY | Facility: CLINIC | Age: 69
End: 2024-05-29
Payer: MEDICARE

## 2024-05-29 ENCOUNTER — HOSPITAL ENCOUNTER (OUTPATIENT)
Dept: MAMMOGRAPHY | Facility: CLINIC | Age: 69
Discharge: HOME | End: 2024-05-29
Payer: MEDICARE

## 2024-05-29 VITALS
RESPIRATION RATE: 16 BRPM | DIASTOLIC BLOOD PRESSURE: 70 MMHG | TEMPERATURE: 98.2 F | HEART RATE: 64 BPM | BODY MASS INDEX: 25.39 KG/M2 | WEIGHT: 158 LBS | HEIGHT: 66 IN | SYSTOLIC BLOOD PRESSURE: 122 MMHG | OXYGEN SATURATION: 95 %

## 2024-05-29 VITALS — BODY MASS INDEX: 25.55 KG/M2 | HEIGHT: 66 IN | WEIGHT: 159 LBS

## 2024-05-29 VITALS
BODY MASS INDEX: 25.71 KG/M2 | HEIGHT: 66 IN | WEIGHT: 160 LBS | RESPIRATION RATE: 18 BRPM | OXYGEN SATURATION: 97 % | HEART RATE: 75 BPM | DIASTOLIC BLOOD PRESSURE: 80 MMHG | SYSTOLIC BLOOD PRESSURE: 124 MMHG | TEMPERATURE: 97.9 F

## 2024-05-29 DIAGNOSIS — Z79.811 USE OF ANASTROZOLE: ICD-10-CM

## 2024-05-29 DIAGNOSIS — D05.12 DUCTAL CARCINOMA IN SITU (DCIS) OF LEFT BREAST: Primary | ICD-10-CM

## 2024-05-29 DIAGNOSIS — Z12.31 SCREENING MAMMOGRAM FOR BREAST CANCER: ICD-10-CM

## 2024-05-29 DIAGNOSIS — D05.12 DUCTAL CARCINOMA IN SITU (DCIS) OF LEFT BREAST: ICD-10-CM

## 2024-05-29 PROBLEM — N18.5 CHRONIC RENAL DISEASE, STAGE V (HCC): Status: ACTIVE | Noted: 2024-05-29

## 2024-05-29 PROCEDURE — 99214 OFFICE O/P EST MOD 30 MIN: CPT | Performed by: PHYSICIAN ASSISTANT

## 2024-05-29 PROCEDURE — 77066 DX MAMMO INCL CAD BI: CPT

## 2024-05-29 PROCEDURE — G0279 TOMOSYNTHESIS, MAMMO: HCPCS

## 2024-05-29 PROCEDURE — 99214 OFFICE O/P EST MOD 30 MIN: CPT | Performed by: SURGERY

## 2024-05-29 RX ORDER — VITAMIN K2 90 MCG
CAPSULE ORAL
COMMUNITY
Start: 2023-01-01

## 2024-05-29 RX ORDER — ANASTROZOLE 1 MG/1
1 TABLET ORAL DAILY
Qty: 90 TABLET | Refills: 1 | Status: SHIPPED | OUTPATIENT
Start: 2024-05-29

## 2024-05-29 NOTE — PROGRESS NOTES
Surgical Oncology Follow Up       [address]  [facility] SURGICAL ONCOLOGY [facility]  [address]  [facility] [address]    Donya Beltran  1955  [id number]  [address]  [facility] SURGICAL ONCOLOGY [place]  [address] [place] [address]    Chief Complaint   Patient presents with    Follow-up       Assessment & Plan   Diagnoses and all orders for this visit:    Ductal carcinoma in situ (DCIS) of left breast    Screening mammogram for breast cancer  -     Mammo screening bilateral w 3d & cad; Future    Use of anastrozole        Advance Care Planning/Advance Directives:  Discussed disease status, cancer treatment plans and/or cancer treatment goals with the patient.     Oncology History:    Oncology History   Ductal carcinoma in situ (DCIS) of left breast   9/9/2019 Initial Diagnosis    Ductal carcinoma in situ (DCIS) of left breast     9/9/2019 -  Cancer Staged    Staging form: Breast, AJCC 8th Edition  - Clinical: Stage 0 (cTis (DCIS), cN0, cM0, G1, ER: Unknown, NC: Unknown) - Signed by Alva Eisenberg MD on 9/9/2019  Laterality: Left  Method of lymph node assessment: Clinical  Histologic grading system: 3 grade system       10/15/2019 -  Cancer Staged    Staging form: Breast, AJCC 8th Edition  - Pathologic: Stage Unknown (pTis (DCIS), pNX, cM0, G2, ER+, NC+) - Signed by Alva Eisenberg MD on 10/15/2019  Laterality: Left  Histologic grading system: 3 grade system           History of Present Illness: Breast cancer follow-up, denies any current breast referable concerns, continues on anastrozole  -Interval History: Benign mammogram    Review of Systems:  Review of Systems   Constitutional: Negative.  Negative for appetite change, fever and unexpected weight change.   HENT: Negative.  Negative for trouble swallowing.    Eyes: Negative.    Respiratory: Negative.  Negative for cough and shortness of breath.    Cardiovascular: Negative.  Negative for chest pain.  "  Gastrointestinal: Negative.  Negative for abdominal pain, nausea and vomiting.   Endocrine: Negative.    Genitourinary: Negative.  Negative for dysuria.   Musculoskeletal: Negative.  Negative for arthralgias and myalgias.   Skin: Negative.    Allergic/Immunologic: Negative.    Neurological: Negative.  Negative for headaches.   Hematological: Negative.  Negative for adenopathy. Does not bruise/bleed easily.   Psychiatric/Behavioral: Negative.         Patient Active Problem List   Diagnosis    Ductal carcinoma in situ (DCIS) of left breast    Use of anastrozole    Gilbert's syndrome    Essential hypertension    Chronic renal disease, stage V (HCC)    Screening mammogram for breast cancer     Past Medical History:   Diagnosis Date    Anxiety     \"time to time\"    Arthritis     Dental crowns present     Eczema     Environmental and seasonal allergies     Food allergy     Hypertension     Irritable bowel syndrome     Lactose intolerance     Motion sickness     Osteoarthritis     mainly hands/feet/right hip    Osteopenia     Vaginal Pap smear 10/06/2017    WNL    Wears contact lenses     Wears glasses      Past Surgical History:   Procedure Laterality Date    BREAST BIOPSY Left 08/30/2019    us bx- neg    BREAST BIOPSY Left 08/30/2019    MRI bx- dcis    BREAST BIOPSY Right 08/30/2019    MRI bx- neg    BREAST LUMPECTOMY Left 09/27/2019    Procedure: LUMPECTOMY BREAST NEEDLE LOCALIZED; 1200 NEEDLE LOC;  Surgeon: Alva Eisenberg MD;  [facility];  Service: Surgical Oncology    COLONOSCOPY      CYSTOSCOPY N/A 07/01/2019    Procedure: CYSTOSCOPY;  Surgeon: Lance Lafleur MD;  [facility];  Service: UroGynecology           ENDOMETRIAL ABLATION      HAND SURGERY Left     cyst removed from palm    LIVER BIOPSY      x2 early 20's    MAMMO NEEDLE LOCALIZATION LEFT (ALL INC) Left 09/27/2019    MRI BREAST BIOPSY LEFT (ALL INCLUSIVE) Left 08/30/2019    MRI BREAST BIOPSY RIGHT (ALL INCLUSIVE) Right 08/30/2019    OK " "POST COLPORRHAPHY RECTOCELE W/WO PERINEORRHAPHY N/A 07/01/2019    Procedure: POSTERIOR COLPORRHAPHY;  Surgeon: Lance Lafleur MD;  [facility];  Service: UroGynecology           PUBOVAGINAL SLING N/A 07/01/2019    Procedure: SINGLE INCISION SLING;  Surgeon: Lance Lafleur MD;  [facility];  Service: UroGynecology           VAGINAL HYSTERECTOMY  01/01/2010    BSO AND REPAIR   UTEROVAGINAL PROLAPSE    WISDOM TOOTH EXTRACTION      WRIST SURGERY      4/2022     Family History   Problem Relation Age of Onset    No Known Problems Mother     Lung cancer Father     No Known Problems Daughter     No Known Problems Daughter     No Known Problems Maternal Grandmother     No Known Problems Maternal Grandfather     No Known Problems Paternal Grandmother     No Known Problems Paternal Grandfather     Lung cancer Maternal Aunt     Lung cancer Family     Diabetes Family      Social History     Socioeconomic History    Marital status: /Civil Union     Spouse name: Not on file    Number of children: Not on file    Years of education: Not on file    Highest education level: Not on file   Occupational History    Not on file   Tobacco Use    Smoking status: Never    Smokeless tobacco: Never   Vaping Use    Vaping status: Never Used   Substance and Sexual Activity    Alcohol use: Yes     Alcohol/week: 1.0 standard drink of alcohol     Types: 1 Standard drinks or equivalent per week     Comment: \"very infrequent\"    Drug use: No    Sexual activity: Yes   Other Topics Concern    Not on file   Social History Narrative    Not on file     Social Determinants of Health     Financial Resource Strain: Not on file   Food Insecurity: No Food Insecurity (5/14/2024)    Received from [facility]    Hunger Vital Sign     Worried About Running Out of Food in the Last Year: Never true     Ran Out of Food in the Last Year: Never true   Transportation Needs: Not on file   Physical Activity: Not on file   Stress: Not on file "   Social Connections: Not on file   Intimate Partner Violence: Not on file   Housing Stability: Not on file       Current Outpatient Medications:     amoxicillin-clavulanate (AUGMENTIN) 875-125 mg per tablet, , Disp: , Rfl:     anastrozole (ARIMIDEX) 1 mg tablet, Take 1 tablet (1 mg total) by mouth daily, Disp: 90 tablet, Rfl: 1    CALCIUM PO, Take by mouth, Disp: , Rfl:     Cholecalciferol (Vitamin D3) 1000 units CAPS, , Disp: , Rfl:     fexofenadine (ALLEGRA) 180 MG tablet, daily as needed , Disp: , Rfl:     fluticasone (FLONASE) 50 mcg/act nasal spray, , Disp: , Rfl:     hydrocortisone 0.5 % cream, Apply topically 2 (two) times a day as needed, Disp: , Rfl:     lisinopril (ZESTRIL) 10 mg tablet, 10 mg daily , Disp: , Rfl:     LORazepam (ATIVAN) 1 mg tablet, Take 1 mg by mouth daily as needed, Disp: , Rfl:     metoprolol succinate (TOPROL-XL) 50 mg 24 hr tablet, , Disp: , Rfl:     montelukast (SINGULAIR) 10 mg tablet, Take 10 mg by mouth every morning, Disp: , Rfl:     rosuvastatin (CRESTOR) 20 MG tablet, Take 20 mg by mouth every morning, Disp: , Rfl:     metoprolol tartrate (LOPRESSOR) 50 mg tablet, daily  (Patient not taking: Reported on 5/29/2024), Disp: , Rfl:   Allergies   Allergen Reactions    Meperidine Nausea Only, Anxiety and Palpitations    Pineapple - Food Allergy Tongue Swelling    Shellfish-Derived Products - Food Allergy Throat Swelling     Happens sometimes with combination of seafood    Strawberry C [Ascorbate - Food Allergy]      Strawberries sometimes tongue swells    Lactose - Food Allergy GI Intolerance     intolerance    Pollen Extract Itching and Nasal Congestion     Receives allergy shots also allergy to dust mites/insects    Tree Extract Allergic Rhinitis       The following portions of the patient's history were reviewed and updated as appropriate: allergies, current medications, past family history, past medical history, past social history, past surgical history, and problem  list.        Vitals:    05/29/24 1434   BP: 122/70   Pulse: 64   Resp: 16   Temp: 98.2 °F (36.8 °C)   SpO2: 95%       Physical Exam  Constitutional:       General: She is not in acute distress.     Appearance: Normal appearance. She is well-developed.   HENT:      Head: Normocephalic and atraumatic.   Cardiovascular:      Heart sounds: Normal heart sounds.   Pulmonary:      Breath sounds: Normal breath sounds.   Chest:   Breasts:     Right: No swelling, bleeding, inverted nipple, mass, nipple discharge, skin change or tenderness.      Left: Skin change (lumpectomy scar) present. No swelling, bleeding, inverted nipple, mass, nipple discharge or tenderness.   Abdominal:      Palpations: Abdomen is soft.   Musculoskeletal:      Right lower leg: No edema.      Left lower leg: No edema.   Lymphadenopathy:      Upper Body:      Right upper body: No supraclavicular, axillary or pectoral adenopathy.      Left upper body: No supraclavicular, axillary or pectoral adenopathy.   Neurological:      Mental Status: She is alert and oriented to person, place, and time.   Psychiatric:         Mood and Affect: Mood normal.           Results:  Labs:      Imaging  5/29/2024 bilateral 3D diagnostic mammogram was benign    I reviewed the above imaging data.    Discussion/Summary: 68-year-old female status post left breast conservation for ductal carcinoma in situ.  She will be completing her anastrozole in September.  There are no concerns on exam today.  Her mammogram done today was also benign.  I will make arrangements for a screening mammogram for next year.  She will also follow-up in our survivorship program for the remaining 5 years of surveillance.

## 2024-05-29 NOTE — PROGRESS NOTES
Hematology/Oncology Outpatient Follow-up  Donya Beltran 68 y.o. female 1955 [id number]    Date:  5/29/2024  Assessment and Plan:  1. Ductal carcinoma in situ (DCIS) of left breast  68-year-old female presents for follow-up regarding history of breast cancer diagnosed in 2019 on adjuvant Arimidex.  She is tolerating well.  She will continue on this through Oct 2024.  Refills provided for until that time.     DEXA scan is up-to-date from 2023 at [facility] near home in [place] and unremarkable for osteoporosis.  next due in 2025 and [facility] PCP will order it.     She is to advise continuation of calcium and vitamin D.     Due to completion of therapy and far drive from us, and completion of tx, no further f/u needed.   Continue imaging and exams with surg onc or GYN.    Follow up med onc PRN.    - anastrozole (ARIMIDEX) 1 mg tablet; Take 1 tablet (1 mg total) by mouth daily  Dispense: 90 tablet; Refill: 1    HPI:  Oncology History   Ductal carcinoma in situ (DCIS) of left breast   9/9/2019 Initial Diagnosis    Ductal carcinoma in situ (DCIS) of left breast     9/9/2019 -  Cancer Staged    Staging form: Breast, AJCC 8th Edition  - Clinical: Stage 0 (cTis (DCIS), cN0, cM0, G1, ER: Unknown, FL: Unknown) - Signed by Alva Eisenberg MD on 9/9/2019  Laterality: Left  Method of lymph node assessment: Clinical  Histologic grading system: 3 grade system       10/15/2019 -  Cancer Staged    Staging form: Breast, AJCC 8th Edition  - Pathologic: Stage Unknown (pTis (DCIS), pNX, cM0, G2, ER+, FL+) - Signed by Alva Eisenberg MD on 10/15/2019  Laterality: Left  Histologic grading system: 3 grade system           68-year-old female presents for follow-up regarding history of left-sided breast cancer. This was diagnosed in September 2019. She underwent lumpectomy for hormone receptor positive, grade 1, 3 mm DCIS the left breast.  Since October 2019 she has been on adjuvant Arimidex.  She has been tolerating this well.  She has  "chronic mild arthritis that she does not feel is worse with Arimidex.  She had hot flashes at the beginning of taking this medication but does not have at this time.     She takes calcium and vitamin-D.  DEXA scan is up-to-date.    Interval history:    ROS: Review of Systems   Constitutional:  Negative for activity change, appetite change, chills, fatigue, fever and unexpected weight change.   Respiratory:  Negative for cough and shortness of breath.    Cardiovascular:  Negative for chest pain, palpitations and leg swelling.   Gastrointestinal:  Negative for abdominal pain, constipation, diarrhea, nausea and vomiting.   Genitourinary:  Negative for difficulty urinating, dysuria and hematuria.   Musculoskeletal:  Positive for arthralgias (hands).   Skin:         Hair thinning   Neurological:  Negative for dizziness, weakness, light-headedness, numbness and headaches.   Hematological: Negative.    Psychiatric/Behavioral: Negative.       Past Medical History:   Diagnosis Date    Anxiety     \"time to time\"    Arthritis     Breast cancer (HCC)     Left 9/2019    Dental crowns present     Eczema     Environmental and seasonal allergies     Food allergy     Hypertension     Irritable bowel syndrome     Lactose intolerance     Motion sickness     Osteoarthritis     mainly hands/feet/right hip    Osteopenia     Vaginal Pap smear 10/06/2017    WNL    Wears contact lenses     Wears glasses        Past Surgical History:   Procedure Laterality Date    BREAST BIOPSY Left 08/30/2019    us bx- neg    BREAST BIOPSY Left 08/30/2019    MRI bx- dcis    BREAST BIOPSY Right 08/30/2019    MRI bx- neg    BREAST LUMPECTOMY Left 09/27/2019    Procedure: LUMPECTOMY BREAST NEEDLE LOCALIZED; 1200 NEEDLE LOC;  Surgeon: Alva Eisenberg MD;  [facility];  Service: Surgical Oncology    COLONOSCOPY      CYSTOSCOPY N/A 07/01/2019    Procedure: CYSTOSCOPY;  Surgeon: Lance Lafleur MD;  [facility];  Service: UroGynecology           " "ENDOMETRIAL ABLATION      HAND SURGERY Left     cyst removed from palm    LIVER BIOPSY      x2 early 20's    MAMMO NEEDLE LOCALIZATION LEFT (ALL INC) Left 09/27/2019    MRI BREAST BIOPSY LEFT (ALL INCLUSIVE) Left 08/30/2019    MRI BREAST BIOPSY RIGHT (ALL INCLUSIVE) Right 08/30/2019    SD POST COLPORRHAPHY RECTOCELE W/WO PERINEORRHAPHY N/A 07/01/2019    Procedure: POSTERIOR COLPORRHAPHY;  Surgeon: Lance Lafleur MD;  [facility];  Service: UroGynecology           PUBOVAGINAL SLING N/A 07/01/2019    Procedure: SINGLE INCISION SLING;  Surgeon: Lance Lafleur MD;  [facility];  Service: UroGynecology           VAGINAL HYSTERECTOMY  01/01/2010    BSO AND REPAIR   UTEROVAGINAL PROLAPSE    WISDOM TOOTH EXTRACTION      WRIST SURGERY      4/2022       Social History     Socioeconomic History    Marital status: /Civil Union     Spouse name: Not on file    Number of children: Not on file    Years of education: Not on file    Highest education level: Not on file   Occupational History    Not on file   Tobacco Use    Smoking status: Never    Smokeless tobacco: Never   Vaping Use    Vaping status: Never Used   Substance and Sexual Activity    Alcohol use: Yes     Alcohol/week: 1.0 standard drink of alcohol     Types: 1 Standard drinks or equivalent per week     Comment: \"very infrequent\"    Drug use: No    Sexual activity: Yes   Other Topics Concern    Not on file   Social History Narrative    Not on file     Social Determinants of Health     Financial Resource Strain: Not on file   Food Insecurity: No Food Insecurity (5/14/2024)    Received from [facility]    Hunger Vital Sign     Worried About Running Out of Food in the Last Year: Never true     Ran Out of Food in the Last Year: Never true   Transportation Needs: Not on file   Physical Activity: Not on file   Stress: Not on file   Social Connections: Not on file   Intimate Partner Violence: Not on file   Housing Stability: Not on file       Family " History   Problem Relation Age of Onset    No Known Problems Mother     Lung cancer Father     No Known Problems Daughter     No Known Problems Daughter     No Known Problems Maternal Grandmother     No Known Problems Maternal Grandfather     No Known Problems Paternal Grandmother     No Known Problems Paternal Grandfather     Lung cancer Maternal Aunt     Lung cancer Family     Diabetes Family        Allergies   Allergen Reactions    Meperidine Nausea Only, Anxiety and Palpitations    Pineapple - Food Allergy Tongue Swelling    Shellfish-Derived Products - Food Allergy Throat Swelling     Happens sometimes with combination of seafood    Strawberry C [Ascorbate - Food Allergy]      Strawberries sometimes tongue swells    Lactose - Food Allergy GI Intolerance     intolerance    Pollen Extract Itching and Nasal Congestion     Receives allergy shots also allergy to dust mites/insects    Tree Extract Allergic Rhinitis         Current Outpatient Medications:     amoxicillin-clavulanate (AUGMENTIN) 875-125 mg per tablet, , Disp: , Rfl:     anastrozole (ARIMIDEX) 1 mg tablet, Take 1 tablet (1 mg total) by mouth daily, Disp: 90 tablet, Rfl: 1    CALCIUM PO, Take by mouth, Disp: , Rfl:     Cholecalciferol (Vitamin D3) 1000 units CAPS, , Disp: , Rfl:     fexofenadine (ALLEGRA) 180 MG tablet, daily as needed , Disp: , Rfl:     fluticasone (FLONASE) 50 mcg/act nasal spray, , Disp: , Rfl:     hydrocortisone 0.5 % cream, Apply topically 2 (two) times a day as needed, Disp: , Rfl:     lisinopril (ZESTRIL) 10 mg tablet, 10 mg daily , Disp: , Rfl:     LORazepam (ATIVAN) 1 mg tablet, Take 1 mg by mouth daily as needed, Disp: , Rfl:     metoprolol succinate (TOPROL-XL) 50 mg 24 hr tablet, , Disp: , Rfl:     metoprolol tartrate (LOPRESSOR) 50 mg tablet, daily , Disp: , Rfl:     montelukast (SINGULAIR) 10 mg tablet, Take 10 mg by mouth every morning, Disp: , Rfl:     rosuvastatin (CRESTOR) 20 MG tablet, Take 20 mg by mouth every  "morning, Disp: , Rfl:       Physical Exam:  /80 (BP Location: Left arm)   Pulse 75   Temp 97.9 °F (36.6 °C) (Tympanic)   Resp 18   Ht 5' 5.5\" (1.664 m)   Wt 72.6 kg (160 lb)   SpO2 97%   BMI 26.22 kg/m²     Physical Exam  Vitals reviewed.   Constitutional:       General: She is not in acute distress.     Appearance: She is well-developed. She is not ill-appearing.   HENT:      Head: Normocephalic and atraumatic.   Eyes:      General: No scleral icterus.     Conjunctiva/sclera: Conjunctivae normal.   Cardiovascular:      Rate and Rhythm: Normal rate and regular rhythm.      Heart sounds: Normal heart sounds. No murmur heard.  Pulmonary:      Effort: Pulmonary effort is normal. No respiratory distress.      Breath sounds: Normal breath sounds.   Abdominal:      Palpations: Abdomen is soft.      Tenderness: There is no abdominal tenderness.   Musculoskeletal:         General: No tenderness. Normal range of motion.      Cervical back: Normal range of motion and neck supple.      Right lower leg: No edema.      Left lower leg: No edema.   Lymphadenopathy:      Cervical: No cervical adenopathy.   Skin:     General: Skin is warm and dry.   Neurological:      Mental Status: She is alert and oriented to person, place, and time.      Cranial Nerves: No cranial nerve deficit.   Psychiatric:         Mood and Affect: Mood normal.         Behavior: Behavior normal.       Labs:  Lab Results   Component Value Date    WBC 7.41 09/16/2019    HGB 13.6 09/16/2019    HCT 41.8 09/16/2019    MCV 96 09/16/2019     09/16/2019     I have spent 20 minutes with Patient  today in which greater than 50% of this time was spent in counseling/coordination of care regarding Diagnostic results, Risks and benefits of tx options, Instructions for management, Impressions, Documenting in the medical record, Reviewing / ordering tests, medicine, procedures  , and Obtaining or reviewing history  .    Patient voiced understanding and " agreement in the above discussion. Aware to contact our office with questions/symptoms in the interim.     This note has been generated by voice recognition software system.  Therefore, there may be spelling, grammar, and or syntax errors. Please contact if questions arise.

## 2025-05-28 DIAGNOSIS — Z00.6 ENCOUNTER FOR EXAMINATION FOR NORMAL COMPARISON OR CONTROL IN CLINICAL RESEARCH PROGRAM: ICD-10-CM

## 2025-06-04 ENCOUNTER — HOSPITAL ENCOUNTER (OUTPATIENT)
Dept: MAMMOGRAPHY | Facility: MEDICAL CENTER | Age: 70
Discharge: HOME | End: 2025-06-04
Payer: MEDICARE

## 2025-06-04 ENCOUNTER — OFFICE VISIT (OUTPATIENT)
Dept: SURGICAL ONCOLOGY | Facility: CLINIC | Age: 70
End: 2025-06-04
Payer: MEDICARE

## 2025-06-04 ENCOUNTER — APPOINTMENT (OUTPATIENT)
Dept: LAB | Facility: MEDICAL CENTER | Age: 70
End: 2025-06-04
Payer: MEDICARE

## 2025-06-04 VITALS
DIASTOLIC BLOOD PRESSURE: 82 MMHG | HEIGHT: 66 IN | SYSTOLIC BLOOD PRESSURE: 122 MMHG | HEART RATE: 71 BPM | TEMPERATURE: 97.3 F | OXYGEN SATURATION: 97 % | BODY MASS INDEX: 25.89 KG/M2

## 2025-06-04 VITALS — WEIGHT: 158 LBS | HEIGHT: 66 IN | BODY MASS INDEX: 25.39 KG/M2

## 2025-06-04 DIAGNOSIS — Z12.31 SCREENING MAMMOGRAM FOR BREAST CANCER: ICD-10-CM

## 2025-06-04 DIAGNOSIS — Z85.3 HISTORY OF LEFT BREAST CANCER: ICD-10-CM

## 2025-06-04 DIAGNOSIS — Z08 ENCOUNTER FOR FOLLOW-UP SURVEILLANCE OF BREAST CANCER: Primary | ICD-10-CM

## 2025-06-04 DIAGNOSIS — Z00.6 ENCOUNTER FOR EXAMINATION FOR NORMAL COMPARISON OR CONTROL IN CLINICAL RESEARCH PROGRAM: ICD-10-CM

## 2025-06-04 DIAGNOSIS — Z85.3 ENCOUNTER FOR FOLLOW-UP SURVEILLANCE OF BREAST CANCER: Primary | ICD-10-CM

## 2025-06-04 PROBLEM — N18.5 CHRONIC RENAL DISEASE, STAGE V (HCC): Status: RESOLVED | Noted: 2024-05-29 | Resolved: 2025-06-04

## 2025-06-04 PROCEDURE — 99214 OFFICE O/P EST MOD 30 MIN: CPT | Performed by: NURSE PRACTITIONER

## 2025-06-04 PROCEDURE — 77063 BREAST TOMOSYNTHESIS BI: CPT

## 2025-06-04 PROCEDURE — 36415 COLL VENOUS BLD VENIPUNCTURE: CPT

## 2025-06-04 PROCEDURE — 77067 SCR MAMMO BI INCL CAD: CPT

## 2025-06-04 NOTE — PROGRESS NOTES
Name: Donya Beltran      : 1955      MRN: [id number]  Encounter Provider: MICHELLE Hayden  Encounter Date: 2025   Encounter department: [facility] [place]  :  Assessment & Plan  Encounter for follow-up surveillance of breast cancer         History of left breast cancer         Screening mammogram for breast cancer    Orders:  •  Mammo screening bilateral w 3d and cad; Future    Patient is a 69-year-old female that was diagnosed with a left-sided breast cancer in 2019.  Her pathology revealed Gatta DCIS, %, OK 10%.  She underwent a left lumpectomy with Dr. Eisenberg.  DCISion RT score was low risk therefore patient did not receive adjuvant radiation therapy.  She completed 5 years of anastrozole therapy and is now on observation.  Breast cancer survivorship visit performed today and treatment summary and care plan were reviewed with patient.    She had bilateral 3D screening mammogram performed this morning which is pending.  Clinical exam today revealed some nodularity at the 2 o'clock position of the left breast.  In office ultrasound revealed no worrisome findings and the nodularity corresponded to benign breast tissue.  Prescription given for next year's mammogram.    She is up-to-date on colorectal cancer screening and will be addressing osteoporosis screening with her PCP.    We will plan to see her back in 1 year for a follow-up survivorship visit or sooner should the need arise.  She was instructed to contact us with any changes or concerns in the interim.  All of her questions were answered today.        Survivorship  Discussed symptoms related to disease recurrence, Yes     Evaluated for late effects related to cancer treatment, Yes     Screening current for cervical cancer, Yes n/a age     Screening current for colon cancer, Yes colonoscopy 2022, repeat in 10 years     Cancer rehabilitation services addressed, Yes     Screening current for  osteoporosis, No going to discuss with PCP     Oncology Treatment Summary reviewed with patient and copy provided, Yes     Referral placed for psychosocial evaluation/screening to oncology social work  Yes      Side effects   Surgery- no concerns   Radiation- n/a   Medical Treatment- hot flashes have improved since stopping Anastrozole    Support Groups- has not participated    Family/Friends- reports everyone is good    Healthy Living- walks regularly but hasn't been able to because of intermittent hip pain    Genetics- will contact me if interested in a referral      History of Present Illness   Donya Beltran is a 69 y.o. year old female who presents today for a follow up visit. She has not appreciated any changes on self breast exam. She had a bilateral mammogram this morning which is pending. Reports some right hip pain that she has had intermittently for a few years. She is going to see ortho tomorrow for this complaint. Otherwise denies persistent headaches, back pain, cough, SOB, abdominal pain.  She reports decreased hot flashes since stopping the Anastrozole.    Oncology History   Cancer Staging   Ductal carcinoma in situ (DCIS) of left breast  Staging form: Breast, AJCC 8th Edition  - Clinical: Stage 0 (cTis (DCIS), cN0, cM0, G1, ER: Unknown, TX: Unknown) - Signed by Alva Eisenberg MD on 9/9/2019  Method of lymph node assessment: Clinical  Histologic grading system: 3 grade system  Laterality: Left  - Pathologic: Stage Unknown (pTis (DCIS), pNX, cM0, G2, ER+, TX+) - Signed by Alva Eisenberg MD on 10/15/2019  Histologic grading system: 3 grade system  Laterality: Left  Oncology History   Ductal carcinoma in situ (DCIS) of left breast   8/30/2019 Biopsy    Left breast biopsy:  Ductal carcinoma in situ  Grade 1  %  TX 10%     9/9/2019 -  Cancer Staged    Staging form: Breast, AJCC 8th Edition  - Clinical: Stage 0 (cTis (DCIS), cN0, cM0, G1, ER: Unknown, TX: Unknown) - Signed by Alva Eisenberg MD on  "9/9/2019  Laterality: Left  Method of lymph node assessment: Clinical  Histologic grading system: 3 grade system       9/27/2019 Surgery    Left breast lumpectomy:  - Small focus of ductal carcinoma in situ, 1mm  Grade 1  Clear margins    Dr. Eisenberg     10/15/2019 -  Cancer Staged    Staging form: Breast, AJCC 8th Edition  - Pathologic: Stage Unknown (pTis (DCIS), pNX, cM0, G2, ER+, IL+) - Signed by Alva Eisenberg MD on 10/15/2019  Laterality: Left  Histologic grading system: 3 grade system        Genomic Testing    DCISion RT: 0.9     10/2019 - 10/2024 Hormone Therapy    Anastrozole  Dr. Fregoso        Review of Systems   Constitutional:  Negative for activity change, appetite change, chills, fatigue, fever and unexpected weight change.   Respiratory:  Negative for cough and shortness of breath.    Cardiovascular:  Negative for chest pain.   Gastrointestinal:  Negative for abdominal pain, constipation, diarrhea, nausea and vomiting.   Musculoskeletal:  Positive for arthralgias. Negative for back pain, gait problem and myalgias.        Right hip pain intermittently   Skin:  Negative for color change and rash.   Neurological:  Negative for dizziness and headaches.   Hematological:  Negative for adenopathy.   Psychiatric/Behavioral:  Negative for agitation and confusion.    All other systems reviewed and are negative.   A complete review of systems is negative other than that noted above in the HPI.           Objective   /82 (BP Location: Right arm, Patient Position: Sitting, Cuff Size: Standard)   Pulse 71   Temp (!) 97.3 °F (36.3 °C) (Temporal)   Ht 5' 5.5\" (1.664 m)   SpO2 97%   BMI 25.89 kg/m²     Pain Screening:  Pain Score: 0-No pain  ECOG    Physical Exam  Vitals reviewed.   Constitutional:       General: She is not in acute distress.     Appearance: Normal appearance. She is well-developed. She is not diaphoretic.   HENT:      Head: Normocephalic and atraumatic.     Cardiovascular:      Rate and " Rhythm: Normal rate and regular rhythm.      Heart sounds: Normal heart sounds.   Pulmonary:      Effort: Pulmonary effort is normal.      Breath sounds: Normal breath sounds.   Chest:   Breasts:     Right: No swelling, bleeding, inverted nipple, mass, nipple discharge, skin change or tenderness.      Left: Skin change (surgical scar with fibrosis - appears stable on mammography) present. No swelling, bleeding, inverted nipple, mass, nipple discharge or tenderness.      Comments: Nodularity at the 2:00 position of left breast. In office u/s performed by myself and Dr Eisenberg with no worrisome findings - nodularity corresponds to benign breast tissue  Abdominal:      Palpations: Abdomen is soft. There is no mass.      Tenderness: There is no abdominal tenderness.     Musculoskeletal:         General: Normal range of motion.      Cervical back: Normal range of motion.   Lymphadenopathy:      Upper Body:      Right upper body: No supraclavicular or axillary adenopathy.      Left upper body: No supraclavicular or axillary adenopathy.     Skin:     General: Skin is warm and dry.      Findings: No rash.     Neurological:      Mental Status: She is alert and oriented to person, place, and time.     Psychiatric:         Speech: Speech normal.              Administrative Statements   I have spent a total time of 40 minutes in caring for this patient on the day of the visit/encounter including Diagnostic results, Prognosis, Risks and benefits of tx options, Instructions for management, Patient and family education, Importance of tx compliance, Risk factor reductions, Impressions, Counseling / Coordination of care, Documenting in the medical record, Reviewing/placing orders in the medical record (including tests, medications, and/or procedures), Obtaining or reviewing history  , and Communicating with other healthcare professionals .

## 2025-06-15 LAB
APOB+LDLR+PCSK9 GENE MUT ANL BLD/T: NOT DETECTED
BRCA1+BRCA2 DEL+DUP + FULL MUT ANL BLD/T: NOT DETECTED
MLH1+MSH2+MSH6+PMS2 GN DEL+DUP+FUL M: NOT DETECTED

## (undated) DEVICE — BULB SYRINGE,IRRIGATION WITH PROTECTIVE CAP: Brand: DOVER

## (undated) DEVICE — SUT MONOCRYL 3-0 SH 27 IN Y416H

## (undated) DEVICE — MEDI-VAC YANKAUER SUCTION HANDLE W/BULBOUS AND CONTROL VENT: Brand: CARDINAL HEALTH

## (undated) DEVICE — LIGACLIP MCA MULTIPLE CLIP APPLIERS, 20 SMALL CLIPS: Brand: LIGACLIP

## (undated) DEVICE — TUBING SUCTION 5MM X 12 FT

## (undated) DEVICE — UNDER BUTTOCKS DRAPE W/FLUID CONTROL POUCH: Brand: CONVERTORS

## (undated) DEVICE — SUT MONOCRYL 4-0 PS-2 27 IN Y426H

## (undated) DEVICE — SCD SEQUENTIAL COMPRESSION COMFORT SLEEVE MEDIUM KNEE LENGTH: Brand: KENDALL SCD

## (undated) DEVICE — ADHESIVE SKIN HIGH VISCOSITY EXOFIN 1ML

## (undated) DEVICE — GLOVE PI ULTRA TOUCH SZ.8.0

## (undated) DEVICE — NEEDLE HYPO 22G X 1-1/2 IN

## (undated) DEVICE — GLOVE INDICATOR PI UNDERGLOVE SZ 7 BLUE

## (undated) DEVICE — PLUMEPEN PRO 10FT

## (undated) DEVICE — EXIDINE 4 PCT

## (undated) DEVICE — PREMIUM DRY TRAY LF: Brand: MEDLINE INDUSTRIES, INC.

## (undated) DEVICE — SMOKE EVACUATION TUBING WITH 8 IN INTEGRAL WAND AND SPONGE GUARD: Brand: BUFFALO FILTER

## (undated) DEVICE — INTENDED FOR TISSUE SEPARATION, AND OTHER PROCEDURES THAT REQUIRE A SHARP SURGICAL BLADE TO PUNCTURE OR CUT.: Brand: BARD-PARKER SAFETY BLADES SIZE 11, STERILE

## (undated) DEVICE — VIAL DECANTER

## (undated) DEVICE — SUT VICRYL 0 CT-1 36 IN J946H

## (undated) DEVICE — GLOVE SRG BIOGEL 7

## (undated) DEVICE — GLOVE SRG BIOGEL 6

## (undated) DEVICE — SUT VICRYL 2-0 SH 27 IN UNDYED J417H

## (undated) DEVICE — 2000CC GUARDIAN II: Brand: GUARDIAN

## (undated) DEVICE — ALLENTOWN DR  LUCENTE S LAP PK: Brand: CARDINAL HEALTH

## (undated) DEVICE — CHLORAPREP HI-LITE 26ML ORANGE

## (undated) DEVICE — IV FLUSH NSS 10ML POSIFLUSH

## (undated) DEVICE — ELECTROSURGICAL DEVICE HOLSTER;FOR USE WITH MAXIMUM PEAK VOLTAGE OF 4000 V: Brand: FORCE TRIVERSE

## (undated) DEVICE — INTENDED FOR TISSUE SEPARATION, AND OTHER PROCEDURES THAT REQUIRE A SHARP SURGICAL BLADE TO PUNCTURE OR CUT.: Brand: BARD-PARKER SAFETY BLADES SIZE 15, STERILE

## (undated) DEVICE — SUPER SPONGES,MEDIUM: Brand: KERLIX

## (undated) DEVICE — BRA SURGICAL SZ LGE (36-39)

## (undated) DEVICE — BETHLEHEM UNIVERSAL MINOR GEN: Brand: CARDINAL HEALTH

## (undated) DEVICE — CATH FOLEY 18FR 5ML 2 WAY UNCOATED SILICONE

## (undated) DEVICE — CAUTERY TIP POLISHER: Brand: DEVON

## (undated) DEVICE — NEEDLE 25G X 1 1/2

## (undated) DEVICE — SUT SILK 2-0 SH 30 IN K833H